# Patient Record
Sex: MALE | Race: WHITE | NOT HISPANIC OR LATINO | Employment: OTHER | URBAN - METROPOLITAN AREA
[De-identification: names, ages, dates, MRNs, and addresses within clinical notes are randomized per-mention and may not be internally consistent; named-entity substitution may affect disease eponyms.]

---

## 2021-02-19 ENCOUNTER — HOSPITAL ENCOUNTER (OUTPATIENT)
Dept: RESEARCH | Facility: CLINIC | Age: 86
Discharge: HOME | End: 2021-02-19
Admitting: STUDENT IN AN ORGANIZED HEALTH CARE EDUCATION/TRAINING PROGRAM

## 2021-02-19 PROCEDURE — 510N000009 HC RESEARCH FACILITY, PER 15 MIN

## 2021-02-19 PROCEDURE — 510N000017 HC CRU PATIENT CARE, PER 15 MIN

## 2021-11-24 ENCOUNTER — APPOINTMENT (OUTPATIENT)
Dept: CT IMAGING | Facility: CLINIC | Age: 86
End: 2021-11-24
Attending: EMERGENCY MEDICINE
Payer: COMMERCIAL

## 2021-11-24 ENCOUNTER — HOSPITAL ENCOUNTER (EMERGENCY)
Facility: CLINIC | Age: 86
Discharge: HOME | End: 2021-11-25
Attending: EMERGENCY MEDICINE | Admitting: EMERGENCY MEDICINE
Payer: COMMERCIAL
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
## 2021-11-24 DIAGNOSIS — S09.90XA INJURY OF HEAD, INITIAL ENCOUNTER: ICD-10-CM

## 2021-11-24 LAB
ALBUMIN SERPL-MCNC: 3.6 G/DL (ref 3.4–5)
ALP SERPL-CCNC: 111 U/L (ref 40–150)
ALT SERPL W P-5'-P-CCNC: 26 U/L (ref 0–70)
ANION GAP SERPL CALCULATED.3IONS-SCNC: 6 MMOL/L (ref 3–14)
AST SERPL W P-5'-P-CCNC: 28 U/L (ref 0–45)
ATRIAL RATE - MUSE: 96 BPM
BASOPHILS # BLD AUTO: 0 10E3/UL (ref 0–0.2)
BASOPHILS NFR BLD AUTO: 0 %
BILIRUB SERPL-MCNC: 0.4 MG/DL (ref 0.2–1.3)
BUN SERPL-MCNC: 15 MG/DL (ref 7–30)
CALCIUM SERPL-MCNC: 9.2 MG/DL (ref 8.5–10.1)
CHLORIDE BLD-SCNC: 107 MMOL/L (ref 94–109)
CO2 SERPL-SCNC: 28 MMOL/L (ref 20–32)
CREAT SERPL-MCNC: 1.08 MG/DL (ref 0.66–1.25)
DIASTOLIC BLOOD PRESSURE - MUSE: NORMAL MMHG
EOSINOPHIL # BLD AUTO: 0.1 10E3/UL (ref 0–0.7)
EOSINOPHIL NFR BLD AUTO: 1 %
ERYTHROCYTE [DISTWIDTH] IN BLOOD BY AUTOMATED COUNT: 12.8 % (ref 10–15)
GFR SERPL CREATININE-BSD FRML MDRD: 61 ML/MIN/1.73M2
GLUCOSE BLD-MCNC: 142 MG/DL (ref 70–99)
HCT VFR BLD AUTO: 45 % (ref 40–53)
HGB BLD-MCNC: 14.8 G/DL (ref 13.3–17.7)
HOLD SPECIMEN: NORMAL
IMM GRANULOCYTES # BLD: 0 10E3/UL
IMM GRANULOCYTES NFR BLD: 0 %
INTERPRETATION ECG - MUSE: NORMAL
LYMPHOCYTES # BLD AUTO: 1.1 10E3/UL (ref 0.8–5.3)
LYMPHOCYTES NFR BLD AUTO: 9 %
MCH RBC QN AUTO: 30.8 PG (ref 26.5–33)
MCHC RBC AUTO-ENTMCNC: 32.9 G/DL (ref 31.5–36.5)
MCV RBC AUTO: 94 FL (ref 78–100)
MONOCYTES # BLD AUTO: 1 10E3/UL (ref 0–1.3)
MONOCYTES NFR BLD AUTO: 9 %
NEUTROPHILS # BLD AUTO: 8.9 10E3/UL (ref 1.6–8.3)
NEUTROPHILS NFR BLD AUTO: 81 %
NRBC # BLD AUTO: 0 10E3/UL
NRBC BLD AUTO-RTO: 0 /100
P AXIS - MUSE: 45 DEGREES
PLATELET # BLD AUTO: 218 10E3/UL (ref 150–450)
POTASSIUM BLD-SCNC: 4.9 MMOL/L (ref 3.4–5.3)
PR INTERVAL - MUSE: 230 MS
PROT SERPL-MCNC: 7.4 G/DL (ref 6.8–8.8)
QRS DURATION - MUSE: 146 MS
QT - MUSE: 382 MS
QTC - MUSE: 482 MS
R AXIS - MUSE: -10 DEGREES
RBC # BLD AUTO: 4.81 10E6/UL (ref 4.4–5.9)
SODIUM SERPL-SCNC: 141 MMOL/L (ref 133–144)
SYSTOLIC BLOOD PRESSURE - MUSE: NORMAL MMHG
T AXIS - MUSE: 1 DEGREES
VENTRICULAR RATE- MUSE: 96 BPM
WBC # BLD AUTO: 11.2 10E3/UL (ref 4–11)

## 2021-11-24 PROCEDURE — 82040 ASSAY OF SERUM ALBUMIN: CPT | Performed by: EMERGENCY MEDICINE

## 2021-11-24 PROCEDURE — 90471 IMMUNIZATION ADMIN: CPT | Performed by: EMERGENCY MEDICINE

## 2021-11-24 PROCEDURE — 90715 TDAP VACCINE 7 YRS/> IM: CPT | Performed by: EMERGENCY MEDICINE

## 2021-11-24 PROCEDURE — 250N000011 HC RX IP 250 OP 636: Performed by: EMERGENCY MEDICINE

## 2021-11-24 PROCEDURE — 99284 EMERGENCY DEPT VISIT MOD MDM: CPT | Mod: 25

## 2021-11-24 PROCEDURE — 70450 CT HEAD/BRAIN W/O DYE: CPT

## 2021-11-24 PROCEDURE — 85025 COMPLETE CBC W/AUTO DIFF WBC: CPT | Performed by: EMERGENCY MEDICINE

## 2021-11-24 PROCEDURE — 36415 COLL VENOUS BLD VENIPUNCTURE: CPT | Performed by: EMERGENCY MEDICINE

## 2021-11-24 PROCEDURE — 93005 ELECTROCARDIOGRAM TRACING: CPT

## 2021-11-24 PROCEDURE — 80053 COMPREHEN METABOLIC PANEL: CPT | Performed by: EMERGENCY MEDICINE

## 2021-11-24 RX ORDER — LIDOCAINE 40 MG/G
CREAM TOPICAL
Status: DISCONTINUED | OUTPATIENT
Start: 2021-11-24 | End: 2021-11-25 | Stop reason: HOSPADM

## 2021-11-24 RX ADMIN — CLOSTRIDIUM TETANI TOXOID ANTIGEN (FORMALDEHYDE INACTIVATED), CORYNEBACTERIUM DIPHTHERIAE TOXOID ANTIGEN (FORMALDEHYDE INACTIVATED), BORDETELLA PERTUSSIS TOXOID ANTIGEN (GLUTARALDEHYDE INACTIVATED), BORDETELLA PERTUSSIS FILAMENTOUS HEMAGGLUTININ ANTIGEN (FORMALDEHYDE INACTIVATED), BORDETELLA PERTUSSIS PERTACTIN ANTIGEN, AND BORDETELLA PERTUSSIS FIMBRIAE 2/3 ANTIGEN 0.5 ML: 5; 2; 2.5; 5; 3; 5 INJECTION, SUSPENSION INTRAMUSCULAR at 23:40

## 2021-11-25 VITALS
HEART RATE: 80 BPM | WEIGHT: 160 LBS | RESPIRATION RATE: 21 BRPM | TEMPERATURE: 98.2 F | SYSTOLIC BLOOD PRESSURE: 167 MMHG | DIASTOLIC BLOOD PRESSURE: 96 MMHG | OXYGEN SATURATION: 94 %

## 2021-11-25 ASSESSMENT — ENCOUNTER SYMPTOMS
BACK PAIN: 0
SHORTNESS OF BREATH: 0
EYE PAIN: 0
ARTHRALGIAS: 1
NECK PAIN: 0
WOUND: 1

## 2021-11-25 NOTE — ED PROVIDER NOTES
"  History   Chief Complaint:  Fall and Head Injury       The history is provided by the patient.      Jun Bell is a 88 year old male with history of peripheral vascular disease who presents with fall and head injury. The patient reports that he fell while crossing the street after a 6 to 7 mile walk around [facility]. He states that he landed on both his hands and hit his head in the fall, noting a contusion to his right brow. Abrasions to his right brow and right cheek are also reported. He notes bilateral \"hand soreness\" and wrist pain when he bends his hands backwards, which is reportedly decreasing in severity. He has not taken any medications for his pain at home. Of note, the patient states that he typically does not walk as far and that he was \"having trouble walking\" at the end of his walk, noting that his legs were getting tired. He reports that he was able to independently stand up and walk home after the fall. Per [facility], the patient's last Tdap was in 2011. He did not experience chest pain or shortness of breath before his fall. He reports no syncope. At this time, the patient denies right eye pain, head pain, dental pain, neck pain, back pain, or leg pain.     Review of Systems   HENT: Negative for dental problem.    Eyes: Negative for pain (R).   Respiratory: Negative for shortness of breath.    Cardiovascular: Negative for chest pain.   Musculoskeletal: Positive for arthralgias (wrists). Negative for back pain and neck pain.   Skin: Positive for wound (head).   Neurological: Negative for syncope.   All other systems reviewed and are negative.      Allergies:  Iodine    Medications:  Singulair  Advair    Past Medical History:     Brow ptosis  Dermatochalasis of both upper eyelids  PVD  Glaucoma  Nuclear sclerosis  Renal colic  Kidney stone    Past Surgical History:    Tympanostomy  Extracapsular cataract removal with lens implant    Family History:    Cataracts  Type II diabetes  Macular " degeneration    Social History:  Presents to the emergency department with his wife  Arrives via wheelchair  Patient has a dog    Physical Exam     Patient Vitals for the past 24 hrs:   BP Temp Temp src Pulse Resp SpO2 Weight   11/25/21 0000 -- -- -- 80 21 94 % --   11/24/21 2345 -- -- -- 84 18 95 % --   11/24/21 2330 (!) 167/96 -- -- 89 21 95 % --   11/24/21 2300 (!) 153/91 -- -- 86 16 95 % --   11/24/21 2245 -- -- -- 85 11 94 % --   11/24/21 2230 (!) 148/87 -- -- 87 24 95 % --   11/24/21 2200 (!) 172/118 -- -- 96 10 97 % --   11/24/21 1945 (!) 168/98 98.2  F (36.8  C) Oral 96 16 95 % 72.6 kg (160 lb)       Physical Exam  General: Resting on the bed.  Head: No obvious trauma to head.  Contusion and abrasion to the right supraorbital   Ears, Nose, Throat:  External ears normal.  Nose normal.  No pharyngeal erythema, swelling or exudate.  Midline uvula.  clear TMs  Eyes:  Conjunctivae clear.  Pupils are equal, round, and reactive.   Neck: Normal range of motion.  Neck supple.  non tender c spine.    CV: Regular rate and rhythm.  No murmurs.      Respiratory: Effort normal and breath sounds normal.  No wheezing or crackles.   Gastrointestinal: Soft.  No distension. There is no tenderness.    Musculoskeletal: Normal range of motion.  Non tender extremities to palpations.  Bilateral wrists non tender to palpation, AROM intact.  2+ radial pulses.  Non tender t/l spine.    Neuro: Alert. Moving all extremities appropriately.  Normal speech.  GCS 15.  CN II-XII grossly intactt.  Gross muscle strength intact of the proximal and distal bilateral upper and lower extremities.  Sensation intact to light touch in all 4 extremities.   Skin: Skin is warm and dry.  No rash noted.       Emergency Department Course     ECG  ECG obtained at 2006, ECG read at 2155  Sinus rhythm with 1st degree AV block  Right bundle branch block, old per chart review   Rate 96 bpm. WA interval 230 ms. QRS duration 146 ms. QT/QTc 382/482 ms. P-R-T axes  45 -10 1.     Imaging:  Head CT w/o contrast   Final Result   IMPRESSION:   1.  No acute intracranial process.        Report per radiology    Laboratory:  Labs Ordered and Resulted from Time of ED Arrival to Time of ED Departure   COMPREHENSIVE METABOLIC PANEL - Abnormal       Result Value    Sodium 141      Potassium 4.9      Chloride 107      Carbon Dioxide (CO2) 28      Anion Gap 6      Urea Nitrogen 15      Creatinine 1.08      Calcium 9.2      Glucose 142 (*)     Alkaline Phosphatase 111      AST 28      ALT 26      Protein Total 7.4      Albumin 3.6      Bilirubin Total 0.4      GFR Estimate 61     CBC WITH PLATELETS AND DIFFERENTIAL - Abnormal    WBC Count 11.2 (*)     RBC Count 4.81      Hemoglobin 14.8      Hematocrit 45.0      MCV 94      MCH 30.8      MCHC 32.9      RDW 12.8      Platelet Count 218      % Neutrophils 81      % Lymphocytes 9      % Monocytes 9      % Eosinophils 1      % Basophils 0      % Immature Granulocytes 0      NRBCs per 100 WBC 0      Absolute Neutrophils 8.9 (*)     Absolute Lymphocytes 1.1      Absolute Monocytes 1.0      Absolute Eosinophils 0.1      Absolute Basophils 0.0      Absolute Immature Granulocytes 0.0      Absolute NRBCs 0.0         Emergency Department Course:    Reviewed:  I reviewed nursing notes, vitals, past medical history and Care Everywhere    Assessments:  2225 I obtained history and examined the patient as noted above.  2252 I rechecked the patient.  2353 I rechecked the patient and explained findings.     Interventions:    Medications   lidocaine 1 % 0.1-1 mL (has no administration in time range)   lidocaine (LMX4) cream (has no administration in time range)   sodium chloride (PF) 0.9% PF flush 3 mL (has no administration in time range)   sodium chloride (PF) 0.9% PF flush 3 mL (has no administration in time range)   Tdap (tetanus-diphtheria-acell pertussis) (ADACEL) injection 0.5 mL (0.5 mLs Intramuscular Given 11/24/21 4759)       Disposition:  The  patient was discharged to home.     Impression & Plan     CMS Diagnoses: None    Medical Decision Makin-year-old male presents with fall and head injury.  Vital signs are stable.  Broad differential was pursued include not limited to syncope, contusion, concussion, skull fracture, facial fracture, laceration, musculoskeletal injury, etc.  Overall patient's well-appearing nontoxic.  EKG was done prior to my assessment and shows no evidence of acute ischemia or arrhythmia.  Patient no chest pain or shortness of breath.  He denied any syncope.  Do not suspect ACS, PE, arrhythmia.  CBC shows no leukocytosis or anemia.  BMP without acute electrolyte, metabolic or renal dysfunction.  I was able to speak with patient about getting head CT and he is agreeable.  Head CT fortunately shows no evidence of acute intracranial pathology.  No other injuries noted on facial examination.  Able to clinically clear C-spine.  Abrasion to the right supraorbital area but no evidence of laceration requiring repair.  This was cleaned and bacitracin was applied.  Patient did have some discomfort with ranging his wrist but declined x-rays.  He has no tenderness on exam.  He is able to move them well.  Low suspicion for occult fracture.  At this point patient wishes to go home.  This appears to have been more of a mechanical fall.  Remainder of head to toe exam is otherwise unremarkable.  Head injury information was provided.  Return precautions were given.  Patient was discharged    Diagnosis:    ICD-10-CM    1. Injury of head, initial encounter  S09.90XA    2. Fall, initial encounter  W19.XXXA        Scribe Disclosure:  I, Aris Rodriguez, am serving as a scribe on 2021 at 10:10 PM to personally document services performed by Jessica Gannon MD based on my observations and the provider's statements to me.             Jessica Gannon MD  21 [id number]

## 2021-11-25 NOTE — ED TRIAGE NOTES
"[place]. Pt states he felt 'a little dizzy\" before his fall. Bilat wrist pain with palpation. Head contusion Noted and dressed with telfa.  "

## 2023-03-01 ENCOUNTER — APPOINTMENT (OUTPATIENT)
Dept: CT IMAGING | Facility: CLINIC | Age: 88
DRG: 177 | End: 2023-03-01
Attending: EMERGENCY MEDICINE
Payer: COMMERCIAL

## 2023-03-01 ENCOUNTER — HOSPITAL ENCOUNTER (INPATIENT)
Facility: CLINIC | Age: 88
LOS: 2 days | Discharge: HOME | DRG: 177 | End: 2023-03-03
Attending: EMERGENCY MEDICINE | Admitting: HOSPITALIST
Payer: COMMERCIAL

## 2023-03-01 ENCOUNTER — APPOINTMENT (OUTPATIENT)
Dept: GENERAL RADIOLOGY | Facility: CLINIC | Age: 88
DRG: 177 | End: 2023-03-01
Attending: EMERGENCY MEDICINE
Payer: COMMERCIAL

## 2023-03-01 DIAGNOSIS — R41.0 CONFUSION: ICD-10-CM

## 2023-03-01 DIAGNOSIS — R29.810 FACIAL DROOP: ICD-10-CM

## 2023-03-01 DIAGNOSIS — R47.81 SLURRED SPEECH: ICD-10-CM

## 2023-03-01 DIAGNOSIS — J18.9 PNEUMONIA OF RIGHT UPPER LOBE DUE TO INFECTIOUS ORGANISM: ICD-10-CM

## 2023-03-01 DIAGNOSIS — G45.9 TIA (TRANSIENT ISCHEMIC ATTACK): Primary | ICD-10-CM

## 2023-03-01 PROBLEM — H40.003 GLAUCOMA SUSPECT OF BOTH EYES: Status: ACTIVE | Noted: 2017-03-01

## 2023-03-01 PROBLEM — U07.1 INFECTION DUE TO 2019 NOVEL CORONAVIRUS: Status: ACTIVE | Noted: 2023-03-01

## 2023-03-01 LAB
ALBUMIN SERPL BCG-MCNC: 4 G/DL (ref 3.5–5.2)
ALP SERPL-CCNC: 107 U/L (ref 40–129)
ALT SERPL W P-5'-P-CCNC: 7 U/L (ref 10–50)
ANION GAP SERPL CALCULATED.3IONS-SCNC: 10 MMOL/L (ref 7–15)
APTT PPP: 30 SECONDS (ref 22–38)
AST SERPL W P-5'-P-CCNC: 17 U/L (ref 10–50)
BASOPHILS # BLD AUTO: 0 10E3/UL (ref 0–0.2)
BASOPHILS NFR BLD AUTO: 0 %
BILIRUB DIRECT SERPL-MCNC: <0.2 MG/DL (ref 0–0.3)
BILIRUB SERPL-MCNC: 0.5 MG/DL
BUN SERPL-MCNC: 10.5 MG/DL (ref 8–23)
CALCIUM SERPL-MCNC: 9.1 MG/DL (ref 8.8–10.2)
CHLORIDE SERPL-SCNC: 100 MMOL/L (ref 98–107)
CREAT SERPL-MCNC: 1.05 MG/DL (ref 0.67–1.17)
DEPRECATED HCO3 PLAS-SCNC: 29 MMOL/L (ref 22–29)
EOSINOPHIL # BLD AUTO: 0 10E3/UL (ref 0–0.7)
EOSINOPHIL NFR BLD AUTO: 0 %
ERYTHROCYTE [DISTWIDTH] IN BLOOD BY AUTOMATED COUNT: 13.2 % (ref 10–15)
GFR SERPL CREATININE-BSD FRML MDRD: 68 ML/MIN/1.73M2
GLUCOSE SERPL-MCNC: 157 MG/DL (ref 70–99)
HBA1C MFR BLD: 6 %
HCT VFR BLD AUTO: 45.9 % (ref 40–53)
HGB BLD-MCNC: 14.8 G/DL (ref 13.3–17.7)
IMM GRANULOCYTES # BLD: 0 10E3/UL
IMM GRANULOCYTES NFR BLD: 0 %
INR PPP: 1.19 (ref 0.85–1.15)
LYMPHOCYTES # BLD AUTO: 0.5 10E3/UL (ref 0.8–5.3)
LYMPHOCYTES NFR BLD AUTO: 6 %
MCH RBC QN AUTO: 30.2 PG (ref 26.5–33)
MCHC RBC AUTO-ENTMCNC: 32.2 G/DL (ref 31.5–36.5)
MCV RBC AUTO: 94 FL (ref 78–100)
MONOCYTES # BLD AUTO: 0.9 10E3/UL (ref 0–1.3)
MONOCYTES NFR BLD AUTO: 10 %
NEUTROPHILS # BLD AUTO: 7.8 10E3/UL (ref 1.6–8.3)
NEUTROPHILS NFR BLD AUTO: 84 %
NRBC # BLD AUTO: 0 10E3/UL
NRBC BLD AUTO-RTO: 0 /100
PLATELET # BLD AUTO: 205 10E3/UL (ref 150–450)
POTASSIUM SERPL-SCNC: 4 MMOL/L (ref 3.4–5.3)
PROT SERPL-MCNC: 6.9 G/DL (ref 6.4–8.3)
RBC # BLD AUTO: 4.9 10E6/UL (ref 4.4–5.9)
SARS-COV-2 RNA RESP QL NAA+PROBE: POSITIVE
SODIUM SERPL-SCNC: 139 MMOL/L (ref 136–145)
TROPONIN T SERPL HS-MCNC: 27 NG/L
WBC # BLD AUTO: 9.4 10E3/UL (ref 4–11)

## 2023-03-01 PROCEDURE — 85610 PROTHROMBIN TIME: CPT | Performed by: EMERGENCY MEDICINE

## 2023-03-01 PROCEDURE — 99291 CRITICAL CARE FIRST HOUR: CPT | Performed by: PHYSICIAN ASSISTANT

## 2023-03-01 PROCEDURE — 99223 1ST HOSP IP/OBS HIGH 75: CPT | Mod: AI | Performed by: HOSPITALIST

## 2023-03-01 PROCEDURE — 250N000011 HC RX IP 250 OP 636: Performed by: EMERGENCY MEDICINE

## 2023-03-01 PROCEDURE — 70450 CT HEAD/BRAIN W/O DYE: CPT

## 2023-03-01 PROCEDURE — 93005 ELECTROCARDIOGRAM TRACING: CPT

## 2023-03-01 PROCEDURE — 82248 BILIRUBIN DIRECT: CPT | Performed by: HOSPITALIST

## 2023-03-01 PROCEDURE — 87040 BLOOD CULTURE FOR BACTERIA: CPT | Performed by: EMERGENCY MEDICINE

## 2023-03-01 PROCEDURE — 85025 COMPLETE CBC W/AUTO DIFF WBC: CPT | Performed by: EMERGENCY MEDICINE

## 2023-03-01 PROCEDURE — 85730 THROMBOPLASTIN TIME PARTIAL: CPT | Performed by: EMERGENCY MEDICINE

## 2023-03-01 PROCEDURE — 80061 LIPID PANEL: CPT | Performed by: HOSPITALIST

## 2023-03-01 PROCEDURE — U0005 INFEC AGEN DETEC AMPLI PROBE: HCPCS | Performed by: EMERGENCY MEDICINE

## 2023-03-01 PROCEDURE — 120N000001 HC R&B MED SURG/OB

## 2023-03-01 PROCEDURE — C9803 HOPD COVID-19 SPEC COLLECT: HCPCS

## 2023-03-01 PROCEDURE — 250N000009 HC RX 250: Performed by: EMERGENCY MEDICINE

## 2023-03-01 PROCEDURE — 99291 CRITICAL CARE FIRST HOUR: CPT | Mod: CS,25

## 2023-03-01 PROCEDURE — 36415 COLL VENOUS BLD VENIPUNCTURE: CPT | Performed by: EMERGENCY MEDICINE

## 2023-03-01 PROCEDURE — 70496 CT ANGIOGRAPHY HEAD: CPT

## 2023-03-01 PROCEDURE — 70498 CT ANGIOGRAPHY NECK: CPT

## 2023-03-01 PROCEDURE — 71046 X-RAY EXAM CHEST 2 VIEWS: CPT

## 2023-03-01 PROCEDURE — 83036 HEMOGLOBIN GLYCOSYLATED A1C: CPT | Performed by: HOSPITALIST

## 2023-03-01 PROCEDURE — XW033E5 INTRODUCTION OF REMDESIVIR ANTI-INFECTIVE INTO PERIPHERAL VEIN, PERCUTANEOUS APPROACH, NEW TECHNOLOGY GROUP 5: ICD-10-PCS | Performed by: HOSPITALIST

## 2023-03-01 PROCEDURE — 84484 ASSAY OF TROPONIN QUANT: CPT | Performed by: EMERGENCY MEDICINE

## 2023-03-01 PROCEDURE — 80053 COMPREHEN METABOLIC PANEL: CPT | Performed by: EMERGENCY MEDICINE

## 2023-03-01 RX ORDER — IOPAMIDOL 755 MG/ML
75 INJECTION, SOLUTION INTRAVASCULAR ONCE
Status: COMPLETED | OUTPATIENT
Start: 2023-03-01 | End: 2023-03-01

## 2023-03-01 RX ORDER — LIDOCAINE 40 MG/G
CREAM TOPICAL
Status: DISCONTINUED | OUTPATIENT
Start: 2023-03-01 | End: 2023-03-03 | Stop reason: HOSPADM

## 2023-03-01 RX ORDER — ACETAMINOPHEN 325 MG/1
650 TABLET ORAL EVERY 6 HOURS PRN
Status: DISCONTINUED | OUTPATIENT
Start: 2023-03-01 | End: 2023-03-02

## 2023-03-01 RX ORDER — AMPICILLIN AND SULBACTAM 2; 1 G/1; G/1
3 INJECTION, POWDER, FOR SOLUTION INTRAMUSCULAR; INTRAVENOUS ONCE
Status: COMPLETED | OUTPATIENT
Start: 2023-03-01 | End: 2023-03-01

## 2023-03-01 RX ORDER — ONDANSETRON 2 MG/ML
4 INJECTION INTRAMUSCULAR; INTRAVENOUS EVERY 6 HOURS PRN
Status: DISCONTINUED | OUTPATIENT
Start: 2023-03-01 | End: 2023-03-03 | Stop reason: HOSPADM

## 2023-03-01 RX ORDER — ONDANSETRON 4 MG/1
4 TABLET, ORALLY DISINTEGRATING ORAL EVERY 6 HOURS PRN
Status: DISCONTINUED | OUTPATIENT
Start: 2023-03-01 | End: 2023-03-03 | Stop reason: HOSPADM

## 2023-03-01 RX ORDER — AZITHROMYCIN 500 MG/1
500 INJECTION, POWDER, LYOPHILIZED, FOR SOLUTION INTRAVENOUS ONCE
Status: DISCONTINUED | OUTPATIENT
Start: 2023-03-01 | End: 2023-03-01

## 2023-03-01 RX ORDER — ACETAMINOPHEN 650 MG/1
650 SUPPOSITORY RECTAL EVERY 6 HOURS PRN
Status: DISCONTINUED | OUTPATIENT
Start: 2023-03-01 | End: 2023-03-02

## 2023-03-01 RX ORDER — CEFTRIAXONE 2 G/1
2 INJECTION, POWDER, FOR SOLUTION INTRAMUSCULAR; INTRAVENOUS ONCE
Status: DISCONTINUED | OUTPATIENT
Start: 2023-03-01 | End: 2023-03-01

## 2023-03-01 RX ADMIN — SODIUM CHLORIDE 100 ML: 900 INJECTION INTRAVENOUS at 16:01

## 2023-03-01 RX ADMIN — AMPICILLIN SODIUM AND SULBACTAM SODIUM 3 G: 2; 1 INJECTION, POWDER, FOR SOLUTION INTRAMUSCULAR; INTRAVENOUS at 18:19

## 2023-03-01 RX ADMIN — IOPAMIDOL 75 ML: 755 INJECTION, SOLUTION INTRAVENOUS at 16:00

## 2023-03-01 ASSESSMENT — ACTIVITIES OF DAILY LIVING (ADL)
ADLS_ACUITY_SCORE: 35

## 2023-03-01 NOTE — CONSULTS
[facility]    Stroke Consult Note    Reason for Consult: Stroke Code     Chief Complaint: Stroke Symptoms      HPI  Jun Bell is a 89 year old male with pertinent past medical history of glaucoma, no prior strokes, bilateral vitreous detachments in the past.    He presented to [facility] emergency department due to slurred speech, confusion, left facial droop, possible left greater than right arm weakness.  He was last known well 12 PM today when he went out for a walk.  When he returned 1445 he had the above findings.  He also reported to his daughter that he had fallen 5 times while on his walk in his house to fall into the snow bank; however, he did not have any evidence of falls and his clothes were completely dry while it is wet outside.  In the ED he was assessed and has very mild slurred speech, per report from family and friends this is significantly improved when he was first found.  At the time being found they said he was able to attempt full sentences but the words were severely slurred and difficult to understand.  No incorrect word usage.  In ED no residual left facial droop.  No associated aphasia or weakness.  No residual confusion although he has significant hearing loss.  CT/CTA were unremarkable.  Discussed with patient and his family that given minor deficits would not be a good candidate for TNK.  /78. BG pending.    Imaging Findings  CTh:  No evidence of acute intracranial hemorrhage, mass, or herniation.    CTA head and neck:  1. Patent arteries in the neck without evidence of dissection. Mild  atherosclerotic disease in the carotid arteries bilaterally without  significant stenosis by NASCET criteria.  2. Patent proximal major intracranial arteries without vascular  cutoff. No significant stenosis. No aneurysm identified.   3. Patchy groundglass opacities in the visualized right upper lung.  This could represent pneumonia.  4. 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     Intravenous Thrombolysis  Not given due to:   - minor/isolated/quickly resolving symptoms    Endovascular Treatment  Not initiated due to absence of proximal vessel occlusion    Impression   Transient confusion (not aphasia), left facial droop, possible left arm weakness, persistent but improved slurred speech, rule out acute stroke versus toxic/infectious/metabolic etiology    Recommendations  -MRI brain with and without contrast, please page stroke team once resulted so we can review imaging  -Rule out toxic/infectious/metabolic etiologies    Patient Follow-up     - final recommendation pending work-up    Thank you for this consult.      Cristin Newsome PA-C  Vascular Neurology    To page me or covering stroke neurology team member, click here: [facility]  Choose \"On Call\" tab at top, then select \"NEUROLOGY/ALL SITES\" from middle drop-down box, press Enter, then look for \"stroke\" or \"telestroke\" for your site.    ______________________________________________________    Clinically Significant Risk Factors Present on Admission               # Coagulation Defect: INR = 1.19 (Ref range: 0.85 - 1.15) and/or PTT = 30 Seconds (Ref range: 22 - 38 Seconds), will monitor for bleeding               Past Medical History   No past medical history on file.  Past Surgical History   No past surgical history on file.  Medications   Home Meds  Prior to Admission medications    Not on File       Scheduled Meds    azithromycin  500 mg Intravenous Once     cefTRIAXone  2 g Intravenous Once       Infusion Meds      PRN Meds      Allergies   No Known Allergies  Family History   No family history on file.  Social History        Review of Systems   The 10 point Review of Systems is negative other than noted in the HPI or here.        PHYSICAL EXAMINATION  Temp:  [99.8  F (37.7  C)] 99.8  F (37.7  C)  Pulse:  [84-92] 87  Resp:  [13-25] 23  BP: (132-133)/(76-78) 132/76  SpO2:  [93 %-97 %] 94 %     General Exam  General:  " patient lying in bed without any acute distress    HEENT:  normocephalic/atraumatic  Pulmonary:  no respiratory distress     Neuro Exam  Mental Status:  alert, oriented x 4 (although says it is February 2023 when it is now March 1st), follows commands, speech clear and fluent, naming and repetition normal  Cranial Nerves:  visual fields intact, PERRL, EOMI with normal smooth pursuit, facial sensation intact and symmetric, facial movements symmetric, hearing not formally tested but intact to conversation, palate elevation symmetric and uvula midline, shoulder shrug strong bilaterally, tongue protrusion midline, Very mild dysarthria  Motor:  normal muscle tone and bulk, no abnormal movements, able to move all limbs spontaneously, strength 5/5 throughout upper and lower extremities, no pronator drift  Reflexes:  toes down-going  Sensory:  light touch sensation intact and symmetric throughout upper and lower extremities, no extinction on double simultaneous stimulation   Coordination:  normal finger-to-nose and heel-to-shin bilaterally without dysmetria, rapid alternating movements symmetric  Station/Gait:  deferred    Dysphagia Screen  Dysarthria or facial droop present - Maintain NPO, consult SLP    Stroke Scales    NIHSS  1a. Level of Consciousness 0-->Alert, keenly responsive   1b. LOC Questions (S) 1-->Answers one question correctly (says February 2023 (it is March 1st now))   1c. LOC Commands 0-->Performs both tasks correctly   2.   Best Gaze 0-->Normal   3.   Visual 0-->No visual loss   4.   Facial Palsy 0-->Normal symmetrical movements   5a. Motor Arm, Left 0-->No drift, limb holds 90 (or 45) degrees for full 10 secs   5b. Motor Arm, Right 0-->No drift, limb holds 90 (or 45) degrees for full 10 secs   6a. Motor Leg, Left 0-->No drift, leg holds 30 degree position for full 5 secs   6b. Motor Leg, right 0-->No drift, leg holds 30 degree position for full 5 secs   7.   Limb Ataxia 0-->Absent   8.   Sensory  0-->Normal, no sensory loss   9.   Best Language 0-->No aphasia, normal   10. Dysarthria 1-->Mild-to-moderate dysarthria, patient slurs at least some words and, at worst, can be understood with some difficulty   11. Extinction and Inattention  0-->No abnormality   Total 2 (03/01/23 1726)       Modified Luis Felipe Score (Pre-morbid)  2 - Slight disability.  Able to look after own affairs without assistance, but unable to carry out all previous activities.    Imaging  I personally reviewed all imaging; relevant findings per HPI.     Lab Results Data   CBC  Recent Labs   Lab 03/01/23  1558   WBC 9.4   RBC 4.90   HGB 14.8   HCT 45.9        Basic Metabolic Panel    Recent Labs   Lab 03/01/23  1558      POTASSIUM 4.0   CHLORIDE 100   CO2 29   BUN 10.5   CR 1.05   *   SHARON 9.1     Liver Panel  No results for input(s): PROTTOTAL, ALBUMIN, BILITOTAL, ALKPHOS, AST, ALT, BILIDIRECT in the last 168 hours.  INR    Recent Labs   Lab Test 03/01/23  1558   INR 1.19*      Lipid Profile  No lab results found.  A1C  No lab results found.  Troponin    Recent Labs   Lab 03/01/23  1558   CTROPT 27*          Stroke Code Data Data   Stroke Code Data  (for stroke code without tele)  Stroke code activated 03/01/23   1554   First stroke provider response 03/01/23   1558   Last known normal 03/01/23   1200   Time of discovery   (or onset of symptoms) 03/01/23   1445   Head CT read by Stroke Neuro Dr/Provider 03/01/23   1607   Was stroke code de-escalated? Yes 03/01/23 1621            I personally examined and evaluated the patient today. At the time of my evaluation and management the patient was in critical condition today due to stroke code. I personally managed review of chart, medical record, meds, imaging, history, exam, and discussion with attending regarding plan and documentation. I spent a total of 90 minutes providing critical care services, evaluating the patient, directing care and reviewing laboratory values and  radiologic reports.

## 2023-03-01 NOTE — ED PROVIDER NOTES
Rapid Assessment Note    History:   Jun Bell is a 89 year old male who presents with slurred speech, confusion, and left greater than right upper extremity weakness noted approximately 1 hour prior to arrival around 2:45 PM.  He was last known to be completely normal at noon.    Here in the ER, he can move all of his extremities, and is noted to have improved by neighbor who accompanies the patient.    I have called a tier 1 code stroke given time less than 4 hours.      Exam:   General:  Alert, interactive  Cardiovascular:  Well perfused  Lungs:  No respiratory distress, no accessory muscle use  Neuro:  Moving all 4 extremities, clear speech, answering questions appropriately for most part.  Skin:  Warm, dry  Psych:  Normal affect      Plan of Care:   I evaluated the patient and developed an initial plan of care. I discussed this plan and explained that I, or one of my partners, would be returning to complete the evaluation.           3/1/2023  [employer]    Portions of this medical record were completed by a scribe. UPON MY REVIEW AND AUTHENTICATION BY ELECTRONIC SIGNATURE, this confirms (a) I performed the applicable clinical services, and (b) the record is accurate.        Ovidio Morocho MD  03/01/23 5978

## 2023-03-01 NOTE — H&P
"[facility]    History and Physical - Hospitalist Service       Date of Admission:  3/1/2023    Assessment & Plan      Jun Bell is a 89 year old male admitted on 3/1/2023 for suspected stroke. He was found to be covid positive in the ED.    # Confirmed COVID-19 infection    # Viral Pneumonia secondary to COVID-19 infection     Symptom Onset Date used to determine duration of isolation.  If unsure, enter \"unknown\"   Date of 1st Positive Test 03/01/23   Vaccination Status Fully Vaccinated       - COVID-19 special precautions, continuous pulse-ox  - Oxygen: Not on oxygen  - Labs: Standard COVID admission labs ordered (CBC with diff, CMP, INR, D-dimer, CRP).   - Imaging: CTA head revealed ground glass opacities in the right upper lungs  - Breathing treatments: no inhalers needed; avoid nebulizers in favor of MDIs   - IV fluids: not indicated at this time  - Antibiotics: concern for aspiration pneumonia, will empirically start on ceftriaxone and azithromycin   - COVID-Focused Medications: Remdesivir x 3 days or until hospital discharge, started on 03/01/23  - DVT Prophylaxis:         - At high risk of thrombotic complications due to COVID-19 (DDimer = N/A )         - hold off anticoagulation pending d-dimer.      Left facial droop  Slurred speech  *Symptoms now resolved  *Stroke alert in the ED, neurology denied them appropriate for tPA  *CT head, CTA head and neck were unremarkable  Plan  --Admit to inpatient  --Neurochecks every 4 hours  --Obtain MRI brain with and without contrast  --NPO pending swallow eval  --PT/OT/SLP  --Echo         Diet: NPO for Medical/Clinical Reasons Except for: Meds    DVT Prophylaxis: Pneumatic Compression Devices  [place]ake Catheter: Not present  Lines: None     Cardiac Monitoring: None  Code Status:   full code    Clinically Significant Risk Factors Present on Admission               # Coagulation Defect: INR = 1.19 (Ref range: 0.85 - 1.15) and/or PTT = 30 Seconds " (Ref range: 22 - 38 Seconds), will monitor for bleeding                 Disposition Plan      Expected Discharge Date: 03/03/2023                  Brit Arnold MD  Hospitalist Service  [facility]  Securely message with [employer] (more info)  Text page via [employer] Paging/Directory     ______________________________________________________________________    Chief Complaint   Left facial droop   dysarthria    History is obtained from the patient and patients family    History of Present Illness   Jun Bell is a 89 year old male who is brought in by family due to sudden onset of dysarthria and left facial droop.     He has a PMHx significant for glaucoma and nephrolithiasis.    He was brought into the ED by family after he was thought to have slurred speech and a left facial droop. Patient lives at home with his daughter. His wife, although does not live with him, notes she speaks with him everyday. History was obtained from both wife and patient.   Per wife, patient was noted to be last known well about noon time, seen by his daughter. Afterwards, he was noted to have slurred speech and left facial droop prompting them to call EMS.     Per patient, he had just finished shoveling the druiveway of  2 of his neighbors. He returned home and subsequently went to walk his dog. He reports falling about 5 times enroute back home, and then had difficulty climbing up the stairs leading into his home. The difficulty in ambulation resolved once he was inside his home. Per family, they question if this actually happened as patient was dry when he got back home despite him claiming he fell into the snow those 5 times.     In the ED, stroke code alert was called.  Patient was evaluated by neurology and deemed not to be a tPA candidate. No facial droop was observed and his speech was slightly slurred although has not returned to normal.  CT head was negative for any acute intracranial abnormalities.  CTA head  was negative for any occlusion or stenosis      Past Medical History    No past medical history on file.    Past Surgical History   No past surgical history on file.    Prior to Admission Medications   None        Review of Systems    The 10 point Review of Systems is negative other than noted in the HPI or here.      Physical Exam   Vital Signs: Temp: 99.8  F (37.7  C) Temp src: Temporal BP: 132/76 Pulse: 87   Resp: 23 SpO2: 94 % O2 Device: None (Room air)    Weight: 146 lbs 13.22 oz    General Appearance: Well appearing for stated age.  Respiratory: CTAB, no rales or ronchi  Cardiovascular: S1, S2 normal, no murmurs  GI: non-tender on palpation, BS present  Neuro: No focal deficits.     Medical Decision Making       55 MINUTES SPENT BY ME on the date of service doing chart review, history, exam, documentation & further activities per the note.      Data     I have personally reviewed the following data over the past 24 hrs:    9.4  \   14.8   / 205     139 100 10.5 /  157 (H)   4.0 29 1.05 \       ALT: 7 (L) AST: 17 AP: 107 TBILI: 0.5   ALB: 4.0 TOT PROTEIN: 6.9 LIPASE: N/A       Trop: 27 (H) BNP: N/A       INR:  1.19 (H) PTT:  30   D-dimer:  N/A Fibrinogen:  N/A       Imaging results reviewed over the past 24 hrs:   Recent Results (from the past 24 hour(s))   CT Head w/o Contrast    Narrative    CT SCAN OF THE HEAD WITHOUT CONTRAST   3/1/2023 4:04 PM     HISTORY: Code stroke to evaluate for potential thrombolysis and  thrombectomy. Altered mental status.    TECHNIQUE: Axial images of the head and coronal reformations without  IV contrast material. Radiation dose for this scan was reduced using  automated exposure control, adjustment of the mA and/or kV according  to patient size, or iterative reconstruction technique.    COMPARISON: Head CT 11/24/2021.    FINDINGS: There is no evidence of intracranial hemorrhage, mass, acute  infarct or anomaly. The ventricles are normal in size, shape and  configuration. The  brain parenchyma and subarachnoid spaces are  normal.     Complete opacification of the right maxillary sinus. The bony  calvarium and bones of the skull base appear intact.       Impression    IMPRESSION: No evidence of acute intracranial hemorrhage, mass, or  herniation.      DON SANCHEZ MD         SYSTEM ID:  [id number]   CTA Head Neck with Contrast    Narrative    CT ANGIOGRAM OF THE HEAD AND NECK WITH CONTRAST  3/1/2023 4:09 PM     HISTORY: Code stroke to evaluate for potential thrombolysis and  thrombectomy. Altered mental status.    TECHNIQUE: CT angiography with an injection of 75 mL Isovue-370 IV  with scans through the head and neck. Images were transferred to a  separate 3-D workstation where multiplanar reformations and 3-D images  were created. Estimates of carotid stenoses are made relative to the  distal internal carotid artery diameters except as noted. Radiation  dose for this scan was reduced using automated exposure control,  adjustment of the mA and/or kV according to patient size, or iterative  reconstruction technique.    COMPARISON: None.     CT HEAD FINDINGS: No contrast enhancing lesions. Cerebral blood flow  is grossly normal.     CT ANGIOGRAM HEAD FINDINGS:  The major intracranial arteries including  the proximal branches of the anterior cerebral, middle cerebral, and  posterior cerebral arteries appear patent without vascular cutoff. No  aneurysm identified. No significant stenosis. Venous circulation is  unremarkable.     CT ANGIOGRAM NECK FINDINGS:   Normal origin of the great vessels from the aortic arch.     Right carotid artery: The right common and internal carotid arteries  are patent. Mild atherosclerotic disease at the carotid bifurcation  and proximal internal carotid artery without significant stenosis by  NASCET criteria.     Left carotid artery: The left common and internal carotid arteries are  patent. Mild atherosclerotic disease at the carotid bifurcation and  proximal  internal carotid artery without significant stenosis by  NASCET criteria.     Vertebral arteries: Vertebral arteries are patent without evidence of  dissection. No significant stenosis.     Other findings: Patchy groundglass opacities in the visualized right  upper lung. There are degenerative changes in the spine.    Large pocket of heterogeneous debris projecting inferiorly from the  hypopharynx, this is favored represent a Zenker's diverticulum.      Impression    IMPRESSION:   1. Patent arteries in the neck without evidence of dissection. Mild  atherosclerotic disease in the carotid arteries bilaterally without  significant stenosis by NASCET criteria.  2. Patent proximal major intracranial arteries without vascular  cutoff. No significant stenosis. No aneurysm identified.   3. Patchy groundglass opacities in the visualized right upper lung.  This could represent pneumonia.  4. 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    Results discussed with Stacia Tang at 4:21 PM on 3/1/2023.     DON SANCHEZ MD         SYSTEM ID:  [id number]   Chest XR,  PA & LAT    Narrative    EXAM: XR CHEST 2 VIEWS  LOCATION: [facility]  DATE/TIME: 3/1/2023 4:41 PM    INDICATION: Confusion.  COMPARISON: None.      Impression    IMPRESSION:     Although the anterior right first rib projects over this region, suspicion for subtle right upper lung opacities; infectious / inflammatory process not excluded. Minimal left base bandlike opacity, probably atelectasis.     No pleural effusion or pneumothorax. Normal heart size. Aortic atherosclerosis.

## 2023-03-01 NOTE — ED PROVIDER NOTES
History     Chief Complaint:  Stroke Symptoms       The history is provided by a friend.      Jun Bell is a 89 year old male who presents with stroke symptoms.  He was last seen normal by his daughter today at noon before she left.  The wife called him in the afternoon and found his speech to be slurred.  The wife then called a neighbor who ran over to see him.  The neighbor states that he had a left-sided facial droop with slurred speech and called 911.  The blood sugar was normal per EMS.  Upon arrival to the emergency room, his droop has resolved, but he still has speech difficulty. His last known well was at 1200 earlier today. He does not have a history of stroke. He denies numbness/tingling or headache, chest pain, shortness of breath.  The neighbor also states that when she went over there that he had stated that he fell 5 times while walking the dog.  His coat and his clothing had absolutely no signs of falling.  The dog was outside in the backyard.  They suspect that this is not true.    Of note the patient's wife states that he does aspirate food frequently.  She states that he chronically coughs up phlegm into a tissue.  He denies this.    Independent Historian: the neighbor the wife after she arrived later.    Review of External Notes: None      ROS:  Review of Systems   All other systems reviewed and are negative.    Allergies:  No Known Allergies     Medications:    The patient denies current use of medications.     Past Medical History:    Glaucoma     Past Surgical History:    L cataract extraction   Unspecified tympanostomy tube placement     Family History:    Mother - cataract, Type 2 DM, macular degeneration     Social History:  PCP: Chery Mackenzie   The patient presents to the ED with his neighbor via private vehicle   The patient lives at home with his wife     Physical Exam     Patient Vitals for the past 24 hrs:   BP Temp Temp src Pulse Resp SpO2 Weight   03/01/23 1618 -- -- -- 87 23 94  % --   03/01/23 1617 -- -- -- 87 15 93 % --   03/01/23 1616 -- -- -- 84 13 93 % --   03/01/23 1615 -- -- -- 92 25 93 % --   03/01/23 1610 132/76 -- -- 92 -- -- --   03/01/23 1555 133/78 99.8  F (37.7  C) Temporal 86 20 97 % 66.6 kg (146 lb 13.2 oz)        Physical Exam  Physical Exam   Constitutional:  Patient is alert. They appear well-developed and well-nourished. No acute distress.   HENT:   Mouth/Throat:   Oropharynx is clear and moist. No tongue laceration  Eyes:    Conjunctivae normal and EOM are normal. Pupils are equal, round, and reactive to light.   Neck:    Normal range of motion.   Cardiovascular: Normal rate, regular rhythm and normal heart sounds.  Exam reveals no gallop and no friction rub.  No murmur heard.  Pulmonary/Chest:  Effort normal and breath sounds normal. Patient has no wheezes. Patient has no rales.   Abdominal:   Soft. Bowel sounds are normal. Patient exhibits no mass. There is no tenderness. There is no rebound and no guarding.   Musculoskeletal:  Normal range of motion. Patient exhibits no edema.   Neurological:   Patient is alert.  Appears to have memory issues.  Patient has normal strength.  I do not appreciate any facial droop.  No facial palsy.Does appear to have some confusion.  No gaze deviation.  There is some mild dysarthria..   Skin:   Skin is warm and dry. No rash noted. No erythema.   Psychiatric:   Unable to assess.     Emergency Department Course   ECG:  ECG taken at 1646, ECG read at 1700  Sinus rhythm with 1st degree block  Right bundle branch block   Abnormal ECG   Rate 80 bpm. AR interval 238 ms. QRS duration 144 ms. QT/QTc 412/475 ms. P-R-T axes 51 10 31.      Imaging:  Chest XR,  PA & LAT  Final Result  IMPRESSION:   Although the anterior right first rib projects over this region, suspicion for subtle right upper lung opacities; infectious / inflammatory process not excluded. Minimal left base bandlike opacity, probably atelectasis.   No pleural effusion or  pneumothorax. Normal heart size. Aortic atherosclerosis.    CTA Head Neck with Contrast  Final Result  IMPRESSION:   1. Patent arteries in the neck without evidence of dissection. Mild  atherosclerotic disease in the carotid arteries bilaterally without  significant stenosis by NASCET criteria.  2. Patent proximal major intracranial arteries without vascular  cutoff. No significant stenosis. No aneurysm identified.   3. Patchy groundglass opacities in the visualized right upper lung.  This could represent pneumonia.  4. 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  Results discussed with Stacia Tang at 4:21 PM on 3/1/2023.   DON SANCHEZ MD     CT Head w/o Contrast  Final Result  IMPRESSION: No evidence of acute intracranial hemorrhage, mass, or  herniation.  DON SANCHEZ MD     Report per radiology    Laboratory:  Labs Ordered and Resulted from Time of ED Arrival to Time of ED Departure   BASIC METABOLIC PANEL - Abnormal       Result Value    Sodium 139      Potassium 4.0      Chloride 100      Carbon Dioxide (CO2) 29      Anion Gap 10      Urea Nitrogen 10.5      Creatinine 1.05      Calcium 9.1      Glucose 157 (*)     GFR Estimate 68     INR - Abnormal    INR 1.19 (*)    TROPONIN T, HIGH SENSITIVITY - Abnormal    Troponin T, High Sensitivity 27 (*)    CBC WITH PLATELETS AND DIFFERENTIAL - Abnormal    WBC Count 9.4      RBC Count 4.90      Hemoglobin 14.8      Hematocrit 45.9      MCV 94      MCH 30.2      MCHC 32.2      RDW 13.2      Platelet Count 205      % Neutrophils 84      % Lymphocytes 6      % Monocytes 10      % Eosinophils 0      % Basophils 0      % Immature Granulocytes 0      NRBCs per 100 WBC 0      Absolute Neutrophils 7.8      Absolute Lymphocytes 0.5 (*)     Absolute Monocytes 0.9      Absolute Eosinophils 0.0      Absolute Basophils 0.0      Absolute Immature Granulocytes 0.0      Absolute NRBCs 0.0     PARTIAL THROMBOPLASTIN TIME - Normal    aPTT 30     GLUCOSE MONITOR NURSING POCT   ROUTINE  UA WITH MICROSCOPIC REFLEX TO CULTURE   COVID-19 VIRUS (CORONAVIRUS) BY PCR   BLOOD CULTURE   BLOOD CULTURE      Emergency Department Course & Assessments:    Interventions:  Medications   ampicillin-sulbactam (UNASYN) 3 g vial to attach to  mL bag (has no administration in time range)   iopamidol (ISOVUE-370) solution 75 mL (75 mLs Intravenous $Given 3/1/23 1600)   Saline Flush - CT (100 mLs Intravenous $Given 3/1/23 1601)      Independent Interpretation (X-rays, CTs, rhythm strip):  I reviewed the patient's images, and agree with radiology's interpretation      Consultations/Discussion of Management or Tests:  1621 I spoke with Radiology regarding findings.   1750 I spoke with Dr Arnold from Hospitalist Services, who accepts the patient for admission.    Social Determinants of Health affecting care:  None      Assessments:  1553 I obtained history and examined the patient as noted above.  1610 The Stroke Neurology team is in the room assessing the patient.   1620 Code stroke was deescalated.     Disposition:  The patient was admitted to the hospital under the care of Dr. Arnold.     Impression & Plan    CMS Diagnoses: The patient has stroke symptoms:         ED Stroke specific documentation           NIHSS PDF     Patient last known well time: 1200  ED Provider first to bedside at: 1553  CT Results received at: 1621    Thrombolytics:   Not given due to:   - no large vessel occlusion    If treating with thrombolytics: Ensure SBP<180 and DBP<105 prior to treatment with thrombolytics.  Administering thrombolytics after treatment with IV labetalol, hydralazine, or nicardipine is reasonable once BP control is established.    Endovascular Retrieval:  Not initiated due to absence of proximal vessel occlusion    National Institutes of Health Stroke Scale (Baseline)  Time Performed: 1553     Score    Level of consciousness: (0)   Alert, keenly responsive    LOC questions: (0)   Answers both questions correctly    LOC  commands: (0)   Performs both tasks correctly    Best gaze: (0)   Normal    Visual: (0)   No visual loss    Facial palsy: (0)   Normal symmetrical movements    Motor arm (left): (0)   No drift    Motor arm (right): (0)   No drift    Motor leg (left): (0)   No drift    Motor leg (right): (0)   No drift    Limb ataxia: (0)   Absent    Sensory: (0)   Normal- no sensory loss    Best language: (0)   Normal- no aphasia    Dysarthria: (1)   Mild to moderate dysarthria    Extinction and inattention: (0)   No abnormality        Total Score:  1        Stroke Mimics were considered (including migraine headache, seizure disorder, hypoglycemia (or hyperglycemia), head or spinal trauma, CNS infection, Toxin ingestion and shock state (e.g. sepsis) .     Medical Decision Making:  Jun Bell is an 89-year-old gentleman presenting to the emergency department via EMS for concerns about stroke.  He had a facial droop as well as slurred speech.  At the time that I evaluated him emergently in stable to his facial droop has resolved and he had mild dysarthria but no aphasia or focal deficits.  A tier 1 code stroke was called due to the last known well being just under 4 hours.  There is no acute occlusion of large vessel on his CT.  There is no bleeding or signs of ischemic stroke.  By the time patient returned from CT his symptoms were very faint.  This was de-escalated.  There is no indication for tPA due to the very mild symptoms.  Neurology was concerned for possible a metabolic or infectious etiology for his symptoms .      In the meantime blood work was obtained.  His CBC and metabolic panel are unremarkable.  His EKG shows a pre-existing right bundle branch block.  His troponin is detectable but again does not complain of any chest pain or anginal equivalents.  On his CTA of his head and neck they did note that there were some patchy opacities in his right upper lung.  A chest x-ray was obtained.  Also concerning for opacities.  I  suspect this may be an aspiration pneumonia given the conversation he had with his wife that he frequently chokes on food.      At this time while the patient may have had a mild stroke at this time he seems to have much resolved his symptoms.  In addition his aspiration pneumonia  may be the cause of his confusion I will treat him with Unasyn to cover him for aspiration pneumonia blood cultures were obtained prior to administration.  We will bring him in to hospital for further management, observation and evaluation.    CRITICAL CARE TIME: 45 minutes    Diagnosis:    ICD-10-CM    1. Confusion  R41.0       2. Pneumonia of right upper lobe due to infectious organism  J18.9       3. Facial droop  R29.810     resolved      4. Slurred speech  R47.81            Discharge Medications:  New Prescriptions    No medications on file          Scribe Disclosure:  I, Neftali Thomas, am serving as a scribe at 4:11 PM on 3/1/2023 to document services personally performed by Stacia Tang MD based on my observations and the provider's statements to me.    3/1/2023   No att. providers found            Stacia Tang MD  03/01/23 1752       Stacia Tang MD  03/01/23 1752

## 2023-03-02 ENCOUNTER — APPOINTMENT (OUTPATIENT)
Dept: MRI IMAGING | Facility: CLINIC | Age: 88
DRG: 177 | End: 2023-03-02
Attending: HOSPITALIST
Payer: COMMERCIAL

## 2023-03-02 ENCOUNTER — APPOINTMENT (OUTPATIENT)
Dept: SPEECH THERAPY | Facility: CLINIC | Age: 88
DRG: 177 | End: 2023-03-02
Attending: HOSPITALIST
Payer: COMMERCIAL

## 2023-03-02 ENCOUNTER — APPOINTMENT (OUTPATIENT)
Dept: PHYSICAL THERAPY | Facility: CLINIC | Age: 88
DRG: 177 | End: 2023-03-02
Attending: HOSPITALIST
Payer: COMMERCIAL

## 2023-03-02 LAB
ALBUMIN UR-MCNC: 20 MG/DL
ANION GAP SERPL CALCULATED.3IONS-SCNC: 10 MMOL/L (ref 7–15)
APPEARANCE UR: CLEAR
ATRIAL RATE - MUSE: 80 BPM
BILIRUB UR QL STRIP: NEGATIVE
BUN SERPL-MCNC: 11.4 MG/DL (ref 8–23)
CALCIUM SERPL-MCNC: 8.7 MG/DL (ref 8.8–10.2)
CHLORIDE SERPL-SCNC: 103 MMOL/L (ref 98–107)
CHOLEST SERPL-MCNC: 185 MG/DL
COLOR UR AUTO: YELLOW
CREAT SERPL-MCNC: 0.99 MG/DL (ref 0.67–1.17)
DEPRECATED HCO3 PLAS-SCNC: 26 MMOL/L (ref 22–29)
DIASTOLIC BLOOD PRESSURE - MUSE: NORMAL MMHG
ERYTHROCYTE [DISTWIDTH] IN BLOOD BY AUTOMATED COUNT: 13.2 % (ref 10–15)
GFR SERPL CREATININE-BSD FRML MDRD: 73 ML/MIN/1.73M2
GLUCOSE BLDC GLUCOMTR-MCNC: 111 MG/DL (ref 70–99)
GLUCOSE SERPL-MCNC: 106 MG/DL (ref 70–99)
GLUCOSE UR STRIP-MCNC: NEGATIVE MG/DL
HCT VFR BLD AUTO: 41.4 % (ref 40–53)
HDLC SERPL-MCNC: 52 MG/DL
HGB BLD-MCNC: 13.5 G/DL (ref 13.3–17.7)
HGB UR QL STRIP: NEGATIVE
INTERPRETATION ECG - MUSE: NORMAL
KETONES UR STRIP-MCNC: 10 MG/DL
LDLC SERPL CALC-MCNC: 120 MG/DL
LEUKOCYTE ESTERASE UR QL STRIP: NEGATIVE
MCH RBC QN AUTO: 30.3 PG (ref 26.5–33)
MCHC RBC AUTO-ENTMCNC: 32.6 G/DL (ref 31.5–36.5)
MCV RBC AUTO: 93 FL (ref 78–100)
MUCOUS THREADS #/AREA URNS LPF: PRESENT /LPF
NITRATE UR QL: NEGATIVE
NONHDLC SERPL-MCNC: 133 MG/DL
P AXIS - MUSE: 51 DEGREES
PH UR STRIP: 7 [PH] (ref 5–7)
PLATELET # BLD AUTO: 142 10E3/UL (ref 150–450)
POTASSIUM SERPL-SCNC: 3.8 MMOL/L (ref 3.4–5.3)
PR INTERVAL - MUSE: 238 MS
PROCALCITONIN SERPL IA-MCNC: 0.15 NG/ML
QRS DURATION - MUSE: 144 MS
QT - MUSE: 412 MS
QTC - MUSE: 475 MS
R AXIS - MUSE: 10 DEGREES
RBC # BLD AUTO: 4.45 10E6/UL (ref 4.4–5.9)
RBC URINE: 1 /HPF
SODIUM SERPL-SCNC: 139 MMOL/L (ref 136–145)
SP GR UR STRIP: 1.01 (ref 1–1.03)
SYSTOLIC BLOOD PRESSURE - MUSE: NORMAL MMHG
T AXIS - MUSE: 31 DEGREES
TRIGL SERPL-MCNC: 65 MG/DL
TROPONIN T SERPL HS-MCNC: 33 NG/L
UROBILINOGEN UR STRIP-MCNC: NORMAL MG/DL
VENTRICULAR RATE- MUSE: 80 BPM
WBC # BLD AUTO: 5.1 10E3/UL (ref 4–11)
WBC URINE: 0 /HPF

## 2023-03-02 PROCEDURE — 258N000003 HC RX IP 258 OP 636: Performed by: HOSPITALIST

## 2023-03-02 PROCEDURE — 92610 EVALUATE SWALLOWING FUNCTION: CPT | Mod: GN

## 2023-03-02 PROCEDURE — 84484 ASSAY OF TROPONIN QUANT: CPT | Performed by: HOSPITALIST

## 2023-03-02 PROCEDURE — 250N000013 HC RX MED GY IP 250 OP 250 PS 637: Performed by: PHYSICIAN ASSISTANT

## 2023-03-02 PROCEDURE — 120N000001 HC R&B MED SURG/OB

## 2023-03-02 PROCEDURE — 81001 URINALYSIS AUTO W/SCOPE: CPT | Performed by: HOSPITALIST

## 2023-03-02 PROCEDURE — 250N000011 HC RX IP 250 OP 636: Performed by: HOSPITALIST

## 2023-03-02 PROCEDURE — 70553 MRI BRAIN STEM W/O & W/DYE: CPT

## 2023-03-02 PROCEDURE — A9585 GADOBUTROL INJECTION: HCPCS | Performed by: EMERGENCY MEDICINE

## 2023-03-02 PROCEDURE — 97161 PT EVAL LOW COMPLEX 20 MIN: CPT | Mod: GP

## 2023-03-02 PROCEDURE — 99232 SBSQ HOSP IP/OBS MODERATE 35: CPT | Performed by: INTERNAL MEDICINE

## 2023-03-02 PROCEDURE — 36415 COLL VENOUS BLD VENIPUNCTURE: CPT | Performed by: HOSPITALIST

## 2023-03-02 PROCEDURE — 82310 ASSAY OF CALCIUM: CPT | Performed by: HOSPITALIST

## 2023-03-02 PROCEDURE — 250N000011 HC RX IP 250 OP 636: Performed by: INTERNAL MEDICINE

## 2023-03-02 PROCEDURE — 97530 THERAPEUTIC ACTIVITIES: CPT | Mod: GP

## 2023-03-02 PROCEDURE — 97116 GAIT TRAINING THERAPY: CPT | Mod: GP

## 2023-03-02 PROCEDURE — 85027 COMPLETE CBC AUTOMATED: CPT | Performed by: HOSPITALIST

## 2023-03-02 PROCEDURE — 255N000002 HC RX 255 OP 636: Performed by: EMERGENCY MEDICINE

## 2023-03-02 PROCEDURE — 84145 PROCALCITONIN (PCT): CPT | Performed by: INTERNAL MEDICINE

## 2023-03-02 RX ORDER — CLOPIDOGREL BISULFATE 75 MG/1
75 TABLET ORAL DAILY
Status: DISCONTINUED | OUTPATIENT
Start: 2023-03-03 | End: 2023-03-03 | Stop reason: HOSPADM

## 2023-03-02 RX ORDER — GADOBUTROL 604.72 MG/ML
7 INJECTION INTRAVENOUS ONCE
Status: COMPLETED | OUTPATIENT
Start: 2023-03-02 | End: 2023-03-02

## 2023-03-02 RX ORDER — AMPICILLIN AND SULBACTAM 2; 1 G/1; G/1
3 INJECTION, POWDER, FOR SOLUTION INTRAMUSCULAR; INTRAVENOUS EVERY 6 HOURS
Status: DISCONTINUED | OUTPATIENT
Start: 2023-03-02 | End: 2023-03-03 | Stop reason: HOSPADM

## 2023-03-02 RX ORDER — CLOPIDOGREL BISULFATE 75 MG/1
300 TABLET ORAL ONCE
Status: COMPLETED | OUTPATIENT
Start: 2023-03-02 | End: 2023-03-02

## 2023-03-02 RX ORDER — ACETAMINOPHEN 650 MG/1
650 SUPPOSITORY RECTAL EVERY 6 HOURS PRN
Status: DISCONTINUED | OUTPATIENT
Start: 2023-03-02 | End: 2023-03-03 | Stop reason: HOSPADM

## 2023-03-02 RX ORDER — ACETAMINOPHEN 325 MG/1
650 TABLET ORAL EVERY 6 HOURS PRN
Status: DISCONTINUED | OUTPATIENT
Start: 2023-03-02 | End: 2023-03-03 | Stop reason: HOSPADM

## 2023-03-02 RX ORDER — ASPIRIN 81 MG/1
81 TABLET ORAL DAILY
Status: DISCONTINUED | OUTPATIENT
Start: 2023-03-02 | End: 2023-03-03 | Stop reason: HOSPADM

## 2023-03-02 RX ADMIN — AMPICILLIN SODIUM AND SULBACTAM SODIUM 3 G: 2; 1 INJECTION, POWDER, FOR SOLUTION INTRAMUSCULAR; INTRAVENOUS at 20:53

## 2023-03-02 RX ADMIN — AMPICILLIN SODIUM AND SULBACTAM SODIUM 3 G: 2; 1 INJECTION, POWDER, FOR SOLUTION INTRAMUSCULAR; INTRAVENOUS at 10:55

## 2023-03-02 RX ADMIN — REMDESIVIR 100 MG: 100 INJECTION, POWDER, LYOPHILIZED, FOR SOLUTION INTRAVENOUS at 21:52

## 2023-03-02 RX ADMIN — ASPIRIN 81 MG: 81 TABLET, COATED ORAL at 16:32

## 2023-03-02 RX ADMIN — GADOBUTROL 7 ML: 604.72 INJECTION INTRAVENOUS at 02:17

## 2023-03-02 RX ADMIN — SODIUM CHLORIDE 50 ML: 9 INJECTION, SOLUTION INTRAVENOUS at 21:53

## 2023-03-02 RX ADMIN — AMPICILLIN SODIUM AND SULBACTAM SODIUM 3 G: 2; 1 INJECTION, POWDER, FOR SOLUTION INTRAMUSCULAR; INTRAVENOUS at 15:14

## 2023-03-02 RX ADMIN — CLOPIDOGREL BISULFATE 300 MG: 75 TABLET ORAL at 16:32

## 2023-03-02 RX ADMIN — REMDESIVIR 200 MG: 100 INJECTION, POWDER, LYOPHILIZED, FOR SOLUTION INTRAVENOUS at 00:08

## 2023-03-02 RX ADMIN — SODIUM CHLORIDE 50 ML: 9 INJECTION, SOLUTION INTRAVENOUS at 01:33

## 2023-03-02 ASSESSMENT — ACTIVITIES OF DAILY LIVING (ADL)
ADLS_ACUITY_SCORE: 35

## 2023-03-02 NOTE — PROGRESS NOTES
03/02/23 1520   Appointment Info   Signing Clinician's Name / Credentials (PT) Mame Perales, PT, DPT   Living Environment   People in Home spouse   Current Living Arrangements house   Home Accessibility stairs to enter home   Number of Stairs, Main Entrance 2;3   Stair Railings, Main Entrance none   Transportation Anticipated family or friend will provide;car, drives self   Living Environment Comments Spouse has been staying w/ their son to take care of him.   Self-Care   Usual Activity Tolerance good   Current Activity Tolerance moderate   Regular Exercise Yes   Activity/Exercise Type walking   Equipment Currently Used at Home none   Fall history within last six months yes   Number of times patient has fallen within last six months 1  (possibly more in the same day w/ this admission)   Activity/Exercise/Self-Care Comment Patient reports independence at baseline without device. His wife has been staying w/ their son in the last couple months to take care of him.   General Information   Onset of Illness/Injury or Date of Surgery 03/01/23   Referring Physician Brit Allison MD   Patient/Family Therapy Goals Statement (PT) to get better   Pertinent History of Current Problem (include personal factors and/or comorbidities that impact the POC) Per chart: Jun Bell is a 89 year old male who is brought in by family due to sudden onset of dysarthria and left facial droop.      He has a PMHx significant for glaucoma and nephrolithiasis.     He was brought into the ED by family after he was thought to have slurred speech and a left facial droop. Patient lives at home with his daughter. His wife, although does not live with him, notes she speaks with him everyday. History was obtained from both wife and patient.   Per wife, patient was noted to be last known well about noon time, seen by his daughter. Afterwards, he was noted to have slurred speech and left facial droop prompting them to call EMS.      Per  patient, he had just finished shoveling the druiveway of  2 of his neighbors. He returned home and subsequently went to walk his dog. He reports falling about 5 times enroute back home, and then had difficulty climbing up the stairs leading into his home. The difficulty in ambulation resolved once he was inside his home. Per family, they question if this actually happened as patient was dry when he got back home despite him claiming he fell into the snow those 5 times.      In the ED, stroke code alert was called.  Patient was evaluated by neurology and deemed not to be a tPA candidate. No facial droop was observed and his speech was slightly slurred although has not returned to normal.  CT head was negative for any acute intracranial abnormalities.  CTA head was negative for any occlusion or stenosis   Existing Precautions/Restrictions fall   Weight-Bearing Status - LLE full weight-bearing   Weight-Bearing Status - RLE full weight-bearing   Cognition   Orientation Status (Cognition) oriented x 4   Cognitive Status Comments [place]; pt needs repeated cues frequently. Unclear if this is cognition or hearing. Pt w/ decreased environmental awareness at times (furniture and lines/tubes).   Pain Assessment   Patient Currently in Pain No   Integumentary/Edema   Integumentary/Edema no deficits were identifed   Posture    Posture Kyphosis   Posture Comments can improve w/ cuing   Range of Motion (ROM)   Range of Motion ROM is WFL   Strength (Manual Muscle Testing)   Strength Comments MMT reveals 5/5 B LE strength   Bed Mobility   Bed Mobility no deficits identified   Comment, (Bed Mobility) moves well in/out of bed   Balance   Balance Comments 45/56 on Bell Balance Scale (<=45 points indicative of falls risk)   Sensory Examination   Sensory Perception patient reports no sensory changes   Sensory Perception Comments light touch even bilaterally. Pt reports no vision changes or dizziness.   Coordination   Coordination no deficits  were identified   Clinical Impression   Criteria for Skilled Therapeutic Intervention Yes, treatment indicated   PT Diagnosis (PT) impaired functional mobility   Influenced by the following impairments decreased activity tolerance   Functional limitations due to impairments bed mob, transfers, ambulation, stairs   Clinical Presentation (PT Evaluation Complexity) Stable/Uncomplicated   Clinical Presentation Rationale clinical judgement   Clinical Decision Making (Complexity) low complexity   Planned Therapy Interventions (PT) gait training;balance training;bed mobility training;home exercise program;neuromuscular re-education;postural re-education;patient/family education;stair training;strengthening;transfer training   Risk & Benefits of therapy have been explained evaluation/treatment results reviewed;care plan/treatment goals reviewed;risks/benefits reviewed;current/potential barriers reviewed;participants voiced agreement with care plan;participants included;patient   PT Total Evaluation Time   PT Eval, Low Complexity Minutes (97558) 20   Physical Therapy Goals   PT Frequency Daily   PT Predicted Duration/Target Date for Goal Attainment 03/09/23   PT Goals Bed Mobility;Transfers;Gait;Stairs   PT: Bed Mobility Independent   PT: Transfers Modified independent;Sit to/from stand;Bed to/from chair   PT: Gait Independent;Greater than 200 feet   PT: Stairs Supervision/stand-by assist;2 stairs   Interventions   Interventions Quick Adds Gait Training;Therapeutic Activity   Therapeutic Activity   Therapeutic Activities: dynamic activities to improve functional performance Minutes (48117) 15   Symptoms Noted During/After Treatment None   Treatment Detail/Skilled Intervention Supine to seated EOB w/ SBA. Pt needing cuing repeated throughout session, unclear if because [place] or cognition. Also demonstrates poor envionrmental awareness at times w/ oxygen line and IV pole. Stood to use toilet w/ supervision; needed verbal cuing  for sequencing of hand washing. Transfered to chair at end of session, pt requesting chair be moved to window so he was able to stand w/ holding IV pole for several minutes without assist.   Gait Training   Gait Training Minutes (07465) 10   Symptoms Noted During/After Treatment (Gait Training) none   Treatment Detail/Skilled Intervention Ambulation in room without device, 15ft laps x 5 (limited space in COVID room). Also worked on stepping over box, needs min-mod assist to avoid kicking it.   [place] Level (Gait Training) contact guard   Physical Assistance Level (Gait Training) verbal cues   Weight Bearing (Gait Training) full weight-bearing   PT Discharge Planning   PT Plan progress activity tolerance w/ gait without device. higher level balance tasks. stairs.   PT Discharge Recommendation (DC Rec) home with assist   PT Rationale for DC Rec Patient appears to be close to his baseline for functional mobility. He's able to walk hallway distance without device without unsteadiness and tolerated well. Will defer to OT for potential cognition deficits.   PT Brief overview of current status SBA w/ verbal cuing for bed mob, transfers, and ambulation without device   Total Session Time   Timed Code Treatment Minutes 25   Total Session Time (sum of timed and untimed services) 45

## 2023-03-02 NOTE — CONSULTS
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    He presented to [facility] emergency department 3/1/23 after he returned home from a walk and daughter noticed that he had significant slurred speech and confusion saying that he fell several times on his walk but there was no evidence of that on his body or any wet clothes and it was wet outside, it was noticed that he had a L facial droop and possibly L arm weakness as well (his daughter has been staying with him while patient's wife is caring for another family member). In ED all symptoms resolved other than slight slurred speech. CT/CTA negative for concerning pathology.    MRI was negative for acute stroke. He was found to have COVID-19 with some evidence of RUL groundglass opacities on imaging. Stroke team consulted for further recommendations.    He was evaluated this AM and speech at baseline per patient and his wife. Discussed possible TIA given the report of L sided weakness as well. Started on DAPT with ASA and plavix. Recommended addition of statin given . Discussed plan as outlined below.    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 60  "years 1 point 1   Blood Pressure     -SBP ? 140 or DBP ?  90    1 point 0   Clinical Features    -Unilateral weakness   -Speech disturbance w/o weakness    -Other    2 points  1 point    0 points 2   Duration of symptoms    ?60 minutes    10-59 minutes    <10 minutes   2 points  1 point  0 points 1   Diabetes  1 point 0   Patient s ABCD2 Score (0-7) = 4       Impression  Transient slurred speech, L facial droop, L arm weakness, confusion in setting of COVID-19 infection, MRI negative for stroke, no concerning vessel pathology, suspect Transient ischemic attack (TIA), ABCD2 score 4, evaluate for cardioembolic sources    Recommendations  - Use orderset: \"Ischemic Stroke Routine Admission\" or \"Ischemic Stroke No Thrombolytics/No Thrombectomy ICU Admission\"  -TTE (with bubble study if 60 yrs or less)   -Neuro checks and vitals every 4 hours  - goal SBP <180 while inpatient  -long term outpatient blood pressure goal <130/80, recommend home monitoring twice daily in AM and PM, keep log and bring to f/u with PCP   -ASA 81 mg daily indefinitely  -plavix 300 mg daily x 1 then 75 mg daily x 21 days  -given no evidence of stroke on MRI and does not appear to have a severe COVID infection, do not recommend anticoagulation at this time   -was 160 lb in 11/2021, now 146 lb, could consider CT c/a/p to rule out malignancy  -, recommend starting at Lipitor 20 mg daily given his age, follow-up with PCP for titration to goal LDL 40-70, <40 increases risk of Intracranial hemorrhage  -Blood glucose monitoring, Hgb A1c 6.0%, new diagnosis prediabetes (goal <7% for secondary stroke prevention), follow-up with PCP  -Mediterranean diet can be beneficial for overall decreased cardiovascular risk, please print hand out for patient at discharge  -telemetry, 30 day CardioNet monitoring at discharge if no Afib found on telemetry (ordered)  -PT/OT/SPT   -Smoking screen: no significant smoking history  -Sleep Apnea screen: patient and his " "wife deny that he has snores or any history of sleep apnea  -Euthermia, euglycemia, eunatremia   -Stroke Education  -Stroke Class per [facility] ([facility])    Patient Follow-up    -f/u with PCP in 1-2 weeks  -f/u with neurology team in 6-8 weeks (ordered)    Thank you for this consult. We will follow peripherally for results of TTE. If no concerns then we will sign off.     Cristin Newsome PA-C  Vascular Neurology    To page me or covering stroke neurology team member, click here: [facility]  Choose \"On Call\" tab at top, then select \"NEUROLOGY/ALL SITES\" from middle drop-down box, press Enter, then look for \"stroke\" or \"telestroke\" for your site.    _____________________________________________________    Clinically Significant Risk Factors Present on Admission               # Coagulation Defect: INR = 1.19 (Ref range: 0.85 - 1.15) and/or PTT = 30 Seconds (Ref range: 22 - 38 Seconds), will monitor for bleeding                 Past Medical History   No past medical history on file.  Past Surgical History   No past surgical history on file.  Medications   Home Meds  Prior to Admission medications    Not on File       Scheduled Meds    remdesivir  100 mg Intravenous Q24H    And     sodium chloride 0.9%  50 mL Intravenous Q24H     ampicillin-sulbactam  3 g Intravenous Q6H     sodium chloride (PF)  3 mL Intracatheter Q8H     sodium chloride (PF)  3 mL Intracatheter Q8H       Infusion Meds    - MEDICATION INSTRUCTIONS -       - MEDICATION INSTRUCTIONS -         PRN Meds  acetaminophen **OR** acetaminophen, lidocaine 4%, lidocaine 4%, lidocaine (buffered or not buffered), lidocaine (buffered or not buffered), - MEDICATION INSTRUCTIONS -, - MEDICATION INSTRUCTIONS -, melatonin, ondansetron **OR** ondansetron, sodium chloride (PF), sodium chloride (PF)    Allergies   No Known Allergies  Family History   No family history on file.  Social History        Review of Systems   The 10 point Review of Systems is negative other " than noted in the HPI or here.        PHYSICAL EXAMINATION   Temp:  [99  F (37.2  C)-99.8  F (37.7  C)] 99  F (37.2  C)  Pulse:  [56-92] 56  Resp:  [13-25] 16  BP: (108-133)/(53-78) 115/65  SpO2:  [92 %-97 %] 97 %    General Exam  General:  patient lying in bed without any acute distress    HEENT:  normocephalic/atraumatic  Pulmonary:  no respiratory distress    Neuro Exam  Mental Status:  alert, oriented x 3, follows commands, speech clear and fluent, naming and repetition normal  Cranial Nerves:  visual fields intact, PERRL, EOMI with normal smooth pursuit, facial sensation intact and symmetric, facial movements symmetric, hearing not formally tested but intact to conversation, palate elevation symmetric and uvula midline, tongue protrusion midline, possible mild dysarthria but patient and wife state his speech is back to baseline  Motor:  normal muscle tone and bulk, no abnormal movements, able to move all limbs spontaneously, strength 4/5 throughout upper and lower extremities, no pronator drift  Reflexes:  toes down-going  Sensory:  light touch sensation intact and symmetric throughout upper and lower extremities, no extinction on double simultaneous stimulation   Coordination:  normal finger-to-nose and heel-to-shin bilaterally without dysmetria  Station/Gait:  deferred    Dysphagia Screen  Per SPT    Stroke Scales    NIHSS  1a. Level of Consciousness 0-->Alert, keenly responsive   1b. LOC Questions 0-->Answers both questions correctly   1c. LOC Commands 0-->Performs both tasks correctly   2.   Best Gaze 0-->Normal   3.   Visual 0-->No visual loss   4.   Facial Palsy 0-->Normal symmetrical movements   5a. Motor Arm, Left 0-->No drift, limb holds 90 (or 45) degrees for full 10 secs   5b. Motor Arm, Right 0-->No drift, limb holds 90 (or 45) degrees for full 10 secs   6a. Motor Leg, Left 0-->No drift, leg holds 30 degree position for full 5 secs   6b. Motor Leg, right 0-->No drift, leg holds 30 degree position  for full 5 secs   7.   Limb Ataxia 0-->Absent   8.   Sensory 0-->Normal, no sensory loss   9.   Best Language 0-->No aphasia, normal   10. Dysarthria (S) 1-->Mild-to-moderate dysarthria, patient slurs at least some words and, at worst, can be understood with some difficulty (possible slight dysarthria but he and wife state it is at baseline)   11. Extinction and Inattention  0-->No abnormality   Total 1 (03/02/23 1542)       Modified [place] Score (Pre-morbid)  2 - Slight disability.  Able to look after own affairs without assistance, but unable to carry out all previous activities.     Imaging  I personally reviewed all imaging; relevant findings per HPI.    Labs Data   CBC  Recent Labs   Lab 03/02/23  0736 03/01/23  1558   WBC 5.1 9.4   RBC 4.45 4.90   HGB 13.5 14.8   HCT 41.4 45.9   * 205     Basic Metabolic Panel   Recent Labs   Lab 03/02/23  0736 03/01/23  1558    139   POTASSIUM 3.8 4.0   CHLORIDE 103 100   CO2 26 29   BUN 11.4 10.5   CR 0.99 1.05   * 157*   SHARON 8.7* 9.1     Liver Panel  Recent Labs   Lab 03/01/23  1558   PROTTOTAL 6.9   ALBUMIN 4.0   BILITOTAL 0.5   ALKPHOS 107   AST 17   ALT 7*     INR    Recent Labs   Lab Test 03/01/23  1558   INR 1.19*      Lipid Profile    Recent Labs   Lab Test 03/01/23  1558   CHOL 185   HDL 52   *   TRIG 65     A1C    Recent Labs   Lab Test 03/01/23  1558   A1C 6.0*     Troponin    Recent Labs   Lab 03/02/23  0027 03/01/23  1558   CTROPT 33* 27*          Stroke Consult Data Data   This was a non-emergent, non-telestroke consult.  I have personally spent a total of 70 minutes providing care today, time spent in reviewing medical records and reviewing tests, examining the patient and obtaining history, coordination of care, and discussion with the patient and/or family regarding diagnostic results, prognosis, symptom management, risks and benefits of management options, and development of plan of care. Greater than 50% was spent in counseling  and coordination of care.

## 2023-03-02 NOTE — ED NOTES
[facility]  ED Nurse Handoff Report    ED Chief complaint: Stroke Symptoms      ED Diagnosis:   Final diagnoses:   Confusion   Pneumonia of right upper lobe due to infectious organism   Facial droop - resolved   Slurred speech       Code Status: see admitting orders    Allergies: No Known Allergies    Patient Story: in with slurred speech, left sided facial droop  Focused Assessment:  No apparent distress. Alert and oriented, occasionally disoriented to situation. Somewhat weak     Treatments and/or interventions provided: labs, abx administered, see imaging  Patient's response to treatments and/or interventions: VSS    To be done/followed up on inpatient unit:  see admitting orders    Does this patient have any cognitive concerns?: Forgetful    Activity level - Baseline/Home:  Stand with Assist  Activity Level - Current:   Stand with Assist    Patient's Preferred language: English   Needed?: No    Isolation: none at this time  Infection:  NA  Patient tested for COVID 19 prior to admission: YES  Bariatric?: No    Vital Signs:   Vitals:    03/01/23 1615 03/01/23 1616 03/01/23 1617 03/01/23 1618   BP:       Pulse: 92 84 87 87   Resp: 25 13 15 23   Temp:       TempSrc:       SpO2: 93% 93% 93% 94%   Weight:           Cardiac Rhythm:     Was the PSS-3 completed:   Yes  What interventions are required if any?               Family Comments: wife at bedside  OBS brochure/video discussed/provided to patient/family: na              Name of person given brochure if not patient: na              Relationship to patient: na    For the majority of the shift this patient's behavior was Green.   Behavioral interventions performed were care explained, care in rounding.    ED NURSE PHONE NUMBER: [id number]

## 2023-03-02 NOTE — PROGRESS NOTES
"[facility]  Hospitalist Progress Note  Davi Augustin MD  03/02/2023    Assessment & Plan   Jun Bell is a 89 year old male admitted on 3/1/2023 for suspected stroke. He was found to be covid positive in the ED.     # Confirmed COVID-19 infection    # Viral Pneumonia secondary to COVID-19 infection     Symptom Onset Date used to determine duration of isolation.  If unsure, enter \"unknown\"   Date of 1st Positive Test 03/01/23   Vaccination Status Fully Vaccinated         - COVID-19 special precautions, continuous pulse-ox  - Oxygen: Not on oxygen  - Labs: Standard COVID admission labs ordered (CBC with diff, CMP, INR, D-dimer, CRP).   - Imaging: CTA head revealed ground glass opacities in the right upper lungs  - Breathing treatments: no inhalers needed; avoid nebulizers in favor of MDIs   - IV fluids: not indicated at this time  - Antibiotics: concern for aspiration pneumonia, will empirically start on ceftriaxone and azithromycin   - COVID-Focused Medications: Remdesivir x 3 days or until hospital discharge, started on 03/01/23  - DVT Prophylaxis:         - At high risk of thrombotic complications due to COVID-19 (DDimer = N/A )         - hold off anticoagulation pending d-dimer.       Metabolic encephalopathy vs TIA  Left facial droop  Slurred speech  *Symptoms now resolved  *Stroke alert in the ED, neurology denied them appropriate for tPA  *CT head, CTA head and neck were unremarkable  --Neurochecks every 4 hours  -- MRI brain with and without contrast negative for stroke  --NPO pending swallow eval  --PT/OT/SLP  --Echo pending    Diet: NPO for Medical/Clinical Reasons Except for: Meds until swallow screen complete   DVT Prophylaxis: Pneumatic Compression Devices  [place] Catheter: Not present  Lines: None     Cardiac Monitoring: None  Code Status:   full code        Clinically Significant Risk Factors Present on Admission       # Coagulation Defect: INR = 1.19 (Ref range: 0.85 - " "1.15) and/or PTT = 30 Seconds (Ref range: 22 - 38 Seconds), will monitor for bleeding               Disposition Plan   -- home vs tcu     Expected Discharge Date:         Interval History   -- chart reviewed  -- MRI negative for stroke    -Data reviewed today: I reviewed all new labs and imaging over the last 24 hours. I personally reviewed no images or EKG's today.    Physical Exam    , Blood pressure 115/57, pulse 74, temperature 99.8  F (37.7  C), temperature source Temporal, resp. rate 19, height 1.727 m (5' 7.99\"), weight 66.6 kg (146 lb 13.2 oz), SpO2 92 %.  Vitals:    03/01/23 1555   Weight: 66.6 kg (146 lb 13.2 oz)     Vital Signs with Ranges  Temp:  [99.8  F (37.7  C)] 99.8  F (37.7  C)  Pulse:  [74-92] 74  Resp:  [13-25] 19  BP: (108-133)/(53-78) 115/57  SpO2:  [92 %-97 %] 92 %  I/O's Last 24 hours  No intake/output data recorded.    Constitutional: Awake, alert, cooperative, no apparent distress  Respiratory: Clear to auscultation bilaterally, no crackles or wheezing  Cardiovascular: Regular rate and rhythm, normal S1 and S2   GI: Normal bowel sounds, soft, non-distended, non-tender  Skin/Integumen: No rashes, no cyanosis, no edema  Other:      Medications   All medications were reviewed.    - MEDICATION INSTRUCTIONS -       - MEDICATION INSTRUCTIONS -         remdesivir  100 mg Intravenous Q24H    And     sodium chloride 0.9%  50 mL Intravenous Q24H     ampicillin-sulbactam  3 g Intravenous Q6H     sodium chloride (PF)  3 mL Intracatheter Q8H     sodium chloride (PF)  3 mL Intracatheter Q8H        Data   Recent Labs   Lab 03/02/23  0736 03/01/23  1558   WBC 5.1 9.4   HGB 13.5 14.8   MCV 93 94   * 205   INR  --  1.19*    139   POTASSIUM 3.8 4.0   CHLORIDE 103 100   CO2 26 29   BUN 11.4 10.5   CR 0.99 1.05   ANIONGAP 10 10   SHARON 8.7* 9.1   * 157*   ALBUMIN  --  4.0   PROTTOTAL  --  6.9   BILITOTAL  --  0.5   ALKPHOS  --  107   ALT  --  7*   AST  --  17       Recent Results (from the " past 24 hour(s))   CT Head w/o Contrast    Narrative    CT SCAN OF THE HEAD WITHOUT CONTRAST   3/1/2023 4:04 PM     HISTORY: Code stroke to evaluate for potential thrombolysis and  thrombectomy. Altered mental status.    TECHNIQUE: Axial images of the head and coronal reformations without  IV contrast material. Radiation dose for this scan was reduced using  automated exposure control, adjustment of the mA and/or kV according  to patient size, or iterative reconstruction technique.    COMPARISON: Head CT 11/24/2021.    FINDINGS: There is no evidence of intracranial hemorrhage, mass, acute  infarct or anomaly. The ventricles are normal in size, shape and  configuration. The brain parenchyma and subarachnoid spaces are  normal.     Complete opacification of the right maxillary sinus. The bony  calvarium and bones of the skull base appear intact.       Impression    IMPRESSION: No evidence of acute intracranial hemorrhage, mass, or  herniation.      DON SANCHEZ MD         SYSTEM ID:  [id number]   CTA Head Neck with Contrast    Narrative    CT ANGIOGRAM OF THE HEAD AND NECK WITH CONTRAST  3/1/2023 4:09 PM     HISTORY: Code stroke to evaluate for potential thrombolysis and  thrombectomy. Altered mental status.    TECHNIQUE: CT angiography with an injection of 75 mL Isovue-370 IV  with scans through the head and neck. Images were transferred to a  separate 3-D workstation where multiplanar reformations and 3-D images  were created. Estimates of carotid stenoses are made relative to the  distal internal carotid artery diameters except as noted. Radiation  dose for this scan was reduced using automated exposure control,  adjustment of the mA and/or kV according to patient size, or iterative  reconstruction technique.    COMPARISON: None.     CT HEAD FINDINGS: No contrast enhancing lesions. Cerebral blood flow  is grossly normal.     CT ANGIOGRAM HEAD FINDINGS:  The major intracranial arteries including  the proximal  branches of the anterior cerebral, middle cerebral, and  posterior cerebral arteries appear patent without vascular cutoff. No  aneurysm identified. No significant stenosis. Venous circulation is  unremarkable.     CT ANGIOGRAM NECK FINDINGS:   Normal origin of the great vessels from the aortic arch.     Right carotid artery: The right common and internal carotid arteries  are patent. Mild atherosclerotic disease at the carotid bifurcation  and proximal internal carotid artery without significant stenosis by  NASCET criteria.     Left carotid artery: The left common and internal carotid arteries are  patent. Mild atherosclerotic disease at the carotid bifurcation and  proximal internal carotid artery without significant stenosis by  NASCET criteria.     Vertebral arteries: Vertebral arteries are patent without evidence of  dissection. No significant stenosis.     Other findings: Patchy groundglass opacities in the visualized right  upper lung. There are degenerative changes in the spine.    Large pocket of heterogeneous debris projecting inferiorly from the  hypopharynx, this is favored represent a Zenker's diverticulum.      Impression    IMPRESSION:   1. Patent arteries in the neck without evidence of dissection. Mild  atherosclerotic disease in the carotid arteries bilaterally without  significant stenosis by NASCET criteria.  2. Patent proximal major intracranial arteries without vascular  cutoff. No significant stenosis. No aneurysm identified.   3. Patchy groundglass opacities in the visualized right upper lung.  This could represent pneumonia.  4. 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    Results discussed with Stacia Tang at 4:21 PM on 3/1/2023.     DON SANCHEZ MD         SYSTEM ID:  [id number]   Chest XR,  PA & LAT    Narrative    EXAM: XR CHEST 2 VIEWS  LOCATION: [facility]  DATE/TIME: 3/1/2023 4:41 PM    INDICATION: Confusion.  COMPARISON: None.      Impression     IMPRESSION:     Although the anterior right first rib projects over this region, suspicion for subtle right upper lung opacities; infectious / inflammatory process not excluded. Minimal left base bandlike opacity, probably atelectasis.     No pleural effusion or pneumothorax. Normal heart size. Aortic atherosclerosis.       MR Brain w/o & w Contrast    Narrative    EXAM: MR BRAIN W/O and W CONTRAST  LOCATION: [facility]  DATE/TIME: 3/2/2023 2:17 AM    INDICATION: Neuro deficit. Altered mental status.  COMPARISON: 03/01/2023  CONTRAST: 7mL Gadavist   TECHNIQUE: Routine multiplanar multisequence head MRI without and with intravenous contrast.    FINDINGS:  INTRACRANIAL CONTENTS: No acute or subacute infarct. No mass, acute hemorrhage, or extra-axial fluid collections. Scattered nonspecific T2/FLAIR hyperintensities within the cerebral white matter most consistent with mild chronic microvascular ischemic   change. Mild generalized cerebral atrophy. No hydrocephalus. Normal position of the cerebellar tonsils. No pathologic contrast enhancement.    SELLA: No abnormality accounting for technique.    OSSEOUS STRUCTURES/SOFT TISSUES: Normal marrow signal. The major intracranial vascular flow voids are maintained.     ORBITS: Prior left cataract surgery. Visualized portions of the orbits are otherwise unremarkable.     SINUSES/MASTOIDS: Complete opacification of the right maxillary sinus. Moderate mucosal thickening of the ethmoid air cells. Trace right mastoid effusion.       Impression    IMPRESSION:  1.  No acute intracranial process.  2.  Generalized brain atrophy and presumed microvascular ischemic changes as detailed above.       Davi Augustin MD  Text Page  (7am to 6pm)

## 2023-03-02 NOTE — ED NOTES
I SPOKE WITH FAMILY ABOUT COVID POSITIVE VISITOR RESTRICTIONS. PT'S WIFE UNDERSTANDS.    Problem: Potential for Falls  Goal: Patient will remain free of falls  Description  INTERVENTIONS:  - Assess patient frequently for physical needs  -  Identify cognitive and physical deficits and behaviors that affect risk of falls  -  Stephan fall precautions as indicated by assessment   - Educate patient/family on patient safety including physical limitations  - Instruct patient to call for assistance with activity based on assessment  - Modify environment to reduce risk of injury  - Consider OT/PT consult to assist with strengthening/mobility  Outcome: Progressing     Problem: Prexisting or High Potential for Compromised Skin Integrity  Goal: Skin integrity is maintained or improved  Description  INTERVENTIONS:  - Identify patients at risk for skin breakdown  - Assess and monitor skin integrity  - Assess and monitor nutrition and hydration status  - Monitor labs   - Assess for incontinence   - Turn and reposition patient  - Assist with mobility/ambulation  - Relieve pressure over bony prominences  - Avoid friction and shearing  - Provide appropriate hygiene as needed including keeping skin clean and dry  - Evaluate need for skin moisturizer/barrier cream  - Collaborate with interdisciplinary team   - Patient/family teaching  - Consider wound care consult   Outcome: Progressing     Problem: Nutrition/Hydration-ADULT  Goal: Nutrient/Hydration intake appropriate for improving, restoring or maintaining nutritional needs  Description  Monitor and assess patient's nutrition/hydration status for malnutrition  Collaborate with interdisciplinary team and initiate plan and interventions as ordered  Monitor patient's weight and dietary intake as ordered or per policy  Utilize nutrition screening tool and intervene as necessary  Determine patient's food preferences and provide high-protein, high-caloric foods as appropriate       INTERVENTIONS:  - Monitor oral intake, urinary output, labs, and treatment plans  - Assess nutrition and hydration status and recommend course of action  - Evaluate amount of meals eaten  - Assist patient with eating if necessary   - Allow adequate time for meals  - Recommend/ encourage appropriate diets, oral nutritional supplements, and vitamin/mineral supplements  - Order, calculate, and assess calorie counts as needed  - Recommend, monitor, and adjust tube feedings and TPN/PPN based on assessed needs  - Assess need for intravenous fluids  - Provide specific nutrition/hydration education as appropriate  - Include patient/family/caregiver in decisions related to nutrition  Outcome: Progressing     Problem: GASTROINTESTINAL - ADULT  Goal: Maintains adequate nutritional intake  Description  INTERVENTIONS:  - Monitor percentage of each meal consumed  - Identify factors contributing to decreased intake, treat as appropriate  - Assist with meals as needed  - Monitor I&O, weight, and lab values if indicated  - Obtain nutrition services referral as needed  Outcome: Progressing     Problem: METABOLIC, FLUID AND ELECTROLYTES - ADULT  Goal: Electrolytes maintained within normal limits  Description  INTERVENTIONS:  - Monitor labs and assess patient for signs and symptoms of electrolyte imbalances  - Administer electrolyte replacement as ordered  - Monitor response to electrolyte replacements, including repeat lab results as appropriate  - Instruct patient on fluid and nutrition as appropriate  Outcome: Progressing  Goal: Fluid balance maintained  Description  INTERVENTIONS:  - Monitor labs   - Monitor I/O and WT  - Instruct patient on fluid and nutrition as appropriate  - Assess for signs & symptoms of volume excess or deficit  Outcome: Progressing  Goal: Glucose maintained within target range  Description  INTERVENTIONS:  - Monitor Blood Glucose as ordered  - Assess for signs and symptoms of hyperglycemia and hypoglycemia  - Administer ordered medications to maintain glucose within target range  - Assess nutritional intake and initiate nutrition service referral as needed  Outcome: Progressing

## 2023-03-02 NOTE — PROGRESS NOTES
"Speech-Language Pathology: Clinical Swallow Evaluation     03/02/23 1400   Appointment Info   Signing Clinician's Name / Credentials (SLP) Marilu Isabel MA, CCC-SLP   General Information   Onset of Illness/Injury or Date of Surgery 03/01/23   Referring Physician Brit Allison MD   Pertinent History of Current Problem Per ordering provier \"   Jun Bell is a 89 year old male who is brought in by family due to sudden onset of dysarthria and left facial droop.      He has a PMHx significant for glaucoma and nephrolithiasis.     He was brought into the ED by family after he was thought to have slurred speech and a left facial droop. Patient lives at home with his daughter. His wife, although does not live with him, notes she speaks with him everyday. History was obtained from both wife and patient.   Per wife, patient was noted to be last known well about noon time, seen by his daughter. Afterwards, he was noted to have slurred speech and left facial droop prompting them to call EMS.      Per patient, he had just finished shoveling the druiveway of  2 of his neighbors. He returned home and subsequently went to walk his dog. He reports falling about 5 times enroute back home, and then had difficulty climbing up the stairs leading into his home. The difficulty in ambulation resolved once he was inside his home. Per family, they question if this actually happened as patient was dry when he got back home despite him claiming he fell into the snow those 5 times.      In the ED, stroke code alert was called.  Patient was evaluated by neurology and deemed not to be a tPA candidate. No facial droop was observed and his speech was slightly slurred although has not returned to normal.  CT head was negative for any acute intracranial abnormalities.  CTA head was negative for any occlusion or stenosis\".   Type of Evaluation   Type of Evaluation Swallow Evaluation   Oral Motor   Oral Musculature generally intact "   Dentition (Oral Motor)   Dentition (Oral Motor) natural dentition;adequate dentition   Facial Symmetry (Oral Motor)   Facial Symmetry (Oral Motor) WNL   Lip Function (Oral Motor)   Lip Range of Motion (Oral Motor) WNL   Lip Sensitivity (Oral Motor) intact   Lip Strength (Oral Motor) WNL   Tongue Function (Oral Motor)   Tongue Strength (Oral Motor) WNL   Tongue Coordination/Speed (Oral Motor) WNL   Tongue ROM (Oral Motor) WNL   Jaw Function (Oral Motor)   Jaw Function (Oral Motor) WNL   Cough/Swallow/Gag Reflex (Oral Motor)   Soft Palate/Velum (Oral Motor) WNL   Volitional Throat Clear/Cough (Oral Motor) WNL   Volitional Swallow (Oral Motor) WNL   Vocal Quality/Secretion Management (Oral Motor)   Vocal Quality (Oral Motor) WNL   Secretion Management (Oral Motor) WNL   General Swallowing Observations   Past History of Dysphagia None per EMR or report.   Current Diet/Method of Nutritional Intake (General Swallowing Observations, NIS) NPO   Swallowing Evaluation Clinical swallow evaluation   Clinical Swallow Evaluation   Clinical Swallow Evaluation Textures Trialed thin liquids;pureed;solid foods   Clinical Swallow Eval: Thin Liquid Texture Trial   Mode of Presentation, Thin Liquids cup;straw;self-fed   Volume of Liquid or Food Presented 12oz   Oral Phase of Swallow WFL   Pharyngeal Phase of Swallow intact   Diagnostic Statement No overt s/s aspiration   Clinical Swallow Evaluation: Puree Solid Texture Trial   Mode of Presentation, Puree spoon;self-fed   Volume of Puree Presented 4oz   Oral Phase, Puree WFL   Pharyngeal Phase, Puree intact   Diagnostic Statement No overt s/s aspiration   Clinical Swallow Evaluation: Solid Food Texture Trial   Mode of Presentation self-fed   Volume Presented x1 cracker   Oral Phase WFL   Pharyngeal Phase intact   Diagnostic Statement No overt s/s aspiration. Pt naturally used liquid wash with solids.   Esophageal Phase of Swallow   Patient reports or presents with symptoms of  esophageal dysphagia No   Esophageal comments Large Zenker's noted on the head and neck CT.   Swallowing Recommendations   Diet Consistency Recommendations regular diet;thin liquids (level 0)   Supervision Level for Intake patient independent   Swallowing Maneuver Recommendations alternate food and liquid intake   Monitoring/Assistance Required (Eating/Swallowing) monitor for cough or change in vocal quality with intake   Recommended Feeding/Eating Techniques (Swallow Eval) maintain upright posture during/after eating for 30 minutes   Medication Administration Recommendations, Swallowing (SLP) One at a time   Instrumental Assessment Recommendations instrumental evaluation not recommended at this time   Comment, Swallowing Recommendations Clinical Swallow Study completed. Patient had no s/s aspiration with any intake. Oral motor function was WNL. Mastication was WNL. Hyolaryngeal elevation appears present upon visualization and palpation. Recommend diet of Regular and Thin with strategies of upright posture, small bites/sip, and alternate liquids/solids. SLP to follow for diet tolerance and strategy use.   General Therapy Interventions   Planned Therapy Interventions Dysphagia Treatment   Dysphagia treatment Instruction of safe swallow strategies   Clinical Impression   Criteria for Skilled Therapeutic Interventions Met (SLP Eval) Yes, treatment indicated   Risks & Benefits of therapy have been explained evaluation/treatment results reviewed;care plan/treatment goals reviewed;risks/benefits reviewed;current/potential barriers reviewed;participants included;participants voiced agreement with care plan;patient   Clinical Impression Comments Speech and language are intact. Pt is oriented to person, place, and time. Okay for Regular and Thin diet with strategies.   SLP Total Evaluation Time   Eval: oral/pharyngeal swallow function, clinical swallow Minutes (37396) 20                      SLP Discharge Planning       SLP  Discharge Recommendation   (Per treatment team)   SLP Rationale for DC Rec No anticipated SLP services at d/c   SLP Brief overview of current status  Recommend diet of Regular and Thin with strategies of upright posture, small bites/sip, and alternate liquids/solids.

## 2023-03-02 NOTE — PROGRESS NOTES
RECEIVING UNIT ED HANDOFF REVIEW    ED Nurse Handoff Report was reviewed by: Abby Dey RN on March 2, 2023 at 1:45 PM

## 2023-03-02 NOTE — UTILIZATION REVIEW
Admission Status; Secondary Review Determination       Under the authority of the Utilization Management Committee, the utilization review process indicated a secondary review on the above patient. The review outcome is based on review of the medical records, discussions with staff, and applying clinical experience noted on the date of the review.     (x) Inpatient Status Appropriate - This patient's medical care is consistent with medical management for inpatient care and reasonable inpatient medical practice.     RATIONALE FOR DETERMINATION      89-year-old male was admitted to the hospital on 3/1/2023 for a suspected stroke and was found to be COVID-19 positive in the ED. He has viral pneumonia secondary to COVID-19 infection.  The patient has ground glass opacities in the right upper lungs. He has left facial droop and slurred speech.  Empiric antibiotic therapy has been initiated, and Remdesivir has been started for COVID-19. The patient is at high risk of thrombotic complications due to COVID-19, and anticoagulation is pending d-dimer. A plan has been made for inpatient admission, neurochecks every 4 hours, an MRI brain with and without contrast, NPO pending swallow evaluation, and physical, occupational, and speech therapy. An echo is also planned.    The expected length of stay at the time of admission was more than 2 nights because of the severity of illness, intensity of service provided, and risk for adverse outcome. Inpatient admission is appropriate.         This document was produced using voice recognition software       The information on this document is developed by the utilization review team in order for the business office to ensure compliance. This only denotes the appropriateness of proper admission status and does not reflect the quality of care rendered.   The definitions of Inpatient Status and Observation Status used in making the determination above are those provided in the CMS  Coverage Manual, Chapter 1 and Chapter 6, section 70.4.   Sincerely,   COLLIN HOLDER MD   System Medical Director   Utilization Management   [facility].

## 2023-03-02 NOTE — PLAN OF CARE
Goal Outcome Evaluation:      Plan of Care Reviewed With: patient    Reason for Admission: Stroke workup. Admitted 3/1 with L weakness and dysarthria. CT/MRI negative for anything acute    Cognitive/Mentation: A/Ox4 but is forgetful and impulsive  Neuros/CMS: Intact ex slight dysarthria (per spouse this is his baseline)  VS: Stable on 2L NC  Tele: NSR  GI: BS active, passing flatus, no BM this shift  : Continent, urinal at the bedside  Pulmonary: LS clear, loose/productive cough  Pain: Denies    Drains/Lines: PIV x2  Skin: Intact  Activity: Assist x1/GB  Diet: Regular/thin    Therapies recs: Pending  Discharge: Pending final workup and therapy recs    Aggression Stoplight Tool: Yellow - impulsive, does not call to get up    End of shift summary: Admitted for stroke workup. Neuros stable and unchanged. Covid +, pt denies having a cough but does have a frequent, loose cough and spits phlegm into a kleenex frequently.

## 2023-03-03 ENCOUNTER — APPOINTMENT (OUTPATIENT)
Dept: CARDIOLOGY | Facility: CLINIC | Age: 88
DRG: 177 | End: 2023-03-03
Attending: HOSPITALIST
Payer: COMMERCIAL

## 2023-03-03 ENCOUNTER — APPOINTMENT (OUTPATIENT)
Dept: CARDIOLOGY | Facility: CLINIC | Age: 88
DRG: 177 | End: 2023-03-03
Attending: PHYSICIAN ASSISTANT
Payer: COMMERCIAL

## 2023-03-03 ENCOUNTER — APPOINTMENT (OUTPATIENT)
Dept: OCCUPATIONAL THERAPY | Facility: CLINIC | Age: 88
DRG: 177 | End: 2023-03-03
Attending: HOSPITALIST
Payer: COMMERCIAL

## 2023-03-03 ENCOUNTER — APPOINTMENT (OUTPATIENT)
Dept: SPEECH THERAPY | Facility: CLINIC | Age: 88
DRG: 177 | End: 2023-03-03
Payer: COMMERCIAL

## 2023-03-03 VITALS
BODY MASS INDEX: 22.25 KG/M2 | TEMPERATURE: 98.2 F | WEIGHT: 146.83 LBS | OXYGEN SATURATION: 93 % | RESPIRATION RATE: 16 BRPM | SYSTOLIC BLOOD PRESSURE: 102 MMHG | HEART RATE: 70 BPM | DIASTOLIC BLOOD PRESSURE: 61 MMHG | HEIGHT: 68 IN

## 2023-03-03 LAB — LVEF ECHO: NORMAL

## 2023-03-03 PROCEDURE — 250N000013 HC RX MED GY IP 250 OP 250 PS 637: Performed by: PHYSICIAN ASSISTANT

## 2023-03-03 PROCEDURE — 255N000002 HC RX 255 OP 636: Performed by: HOSPITALIST

## 2023-03-03 PROCEDURE — 93270 REMOTE 30 DAY ECG REV/REPORT: CPT

## 2023-03-03 PROCEDURE — 97165 OT EVAL LOW COMPLEX 30 MIN: CPT | Mod: GO

## 2023-03-03 PROCEDURE — 92526 ORAL FUNCTION THERAPY: CPT | Mod: GN | Performed by: REHABILITATION PRACTITIONER

## 2023-03-03 PROCEDURE — 999N000208 ECHOCARDIOGRAM COMPLETE

## 2023-03-03 PROCEDURE — 93306 TTE W/DOPPLER COMPLETE: CPT | Mod: 26 | Performed by: INTERNAL MEDICINE

## 2023-03-03 PROCEDURE — 93005 ELECTROCARDIOGRAM TRACING: CPT

## 2023-03-03 PROCEDURE — 97535 SELF CARE MNGMENT TRAINING: CPT | Mod: GO

## 2023-03-03 PROCEDURE — 93010 ELECTROCARDIOGRAM REPORT: CPT | Mod: 76 | Performed by: INTERNAL MEDICINE

## 2023-03-03 PROCEDURE — 250N000011 HC RX IP 250 OP 636: Performed by: INTERNAL MEDICINE

## 2023-03-03 PROCEDURE — 99238 HOSP IP/OBS DSCHRG MGMT 30/<: CPT | Performed by: INTERNAL MEDICINE

## 2023-03-03 PROCEDURE — 97530 THERAPEUTIC ACTIVITIES: CPT | Mod: GO

## 2023-03-03 RX ORDER — CLOPIDOGREL BISULFATE 75 MG/1
75 TABLET ORAL DAILY
Qty: 21 TABLET | Refills: 0 | Status: SHIPPED | OUTPATIENT
Start: 2023-03-04 | End: 2023-03-08 | Stop reason: SINTOL

## 2023-03-03 RX ADMIN — AMPICILLIN SODIUM AND SULBACTAM SODIUM 3 G: 2; 1 INJECTION, POWDER, FOR SOLUTION INTRAMUSCULAR; INTRAVENOUS at 04:22

## 2023-03-03 RX ADMIN — HUMAN ALBUMIN MICROSPHERES AND PERFLUTREN 9 ML: 10; .22 INJECTION, SOLUTION INTRAVENOUS at 11:53

## 2023-03-03 RX ADMIN — CLOPIDOGREL BISULFATE 75 MG: 75 TABLET ORAL at 10:05

## 2023-03-03 RX ADMIN — ASPIRIN 81 MG: 81 TABLET, COATED ORAL at 10:05

## 2023-03-03 RX ADMIN — AMPICILLIN SODIUM AND SULBACTAM SODIUM 3 G: 2; 1 INJECTION, POWDER, FOR SOLUTION INTRAMUSCULAR; INTRAVENOUS at 16:35

## 2023-03-03 RX ADMIN — AMPICILLIN SODIUM AND SULBACTAM SODIUM 3 G: 2; 1 INJECTION, POWDER, FOR SOLUTION INTRAMUSCULAR; INTRAVENOUS at 10:05

## 2023-03-03 ASSESSMENT — ACTIVITIES OF DAILY LIVING (ADL)
ADLS_ACUITY_SCORE: 35
PREVIOUS_RESPONSIBILITIES: MEAL PREP;MEDICATION MANAGEMENT;FINANCES;DRIVING

## 2023-03-03 NOTE — PLAN OF CARE
Reason for Admission: TIA/Covid +    Cognitive/Mentation: A/Ox 4  Neuros/CMS: Intact ex forgetful, generalized weakness  VS: stable. 2L NC at night.   Tele: SR with 1st degree block and PVC's  GI: BS active, passing flatus, no BM this shift. Continent.  : Continent.  Pulmonary: LS diminished.  Pain: denies.   Skin: intact  Activity: Assist x 1 with GB.  Diet: regular with thin liquids. Takes pills whole.     Therapies recs: pending  Discharge: pending    Aggression Stoplight Tool: yellow    End of shift summary: pt impulsive overnight.

## 2023-03-03 NOTE — PROGRESS NOTES
Stroke team following peripherally for TTE. Showed LV norm,Proximal septal thickening, EF  60-65%. No wma, no LV thrombus seen, RV norm, norm b/l atria, no color  Doppler evidence of an atrial shunt, mild mitral annular calcification, trace MR, AV thickened and calcified, no AR, mild valvular aortic stenosis, SR.    No findings to change current management. We will sign off. Please contact us with any concerns.

## 2023-03-03 NOTE — DISCHARGE INSTRUCTIONS
Your risk factors for stroke or TIA (transient ischemic attack):    Your Risk Factors Your Results Normal Ranges   High blood pressure BP Readings from Last 1 Encounters:   03/03/23 102/61    Less than 120/80   Cholesterol              Total Lab Results   Component Value Date    CHOL 185 03/01/2023      Less than 150    Triglycerides   Lab Results   Component Value Date    TRIG 65 03/01/2023    Less than 150   LDL Lab Results   Component Value Date     03/01/2023       Less than 70   HDL Lab Results   Component Value Date    HDL 52 03/01/2023            Greater than 40 (men)  Greater than 50 (women)   Diabetes Recent Labs   Lab 03/02/23  2117   *    Fasting blood glucose    Smoking/tobacco use  Quit smoking and tobacco   Overweight  Lose 1-2 pounds a week   Lack of exercise  30 minutes moderate activity each day   Other risk factors include carotid (neck) artery disease, atrial fibrillation and stress. You may be on new medicine to treat high blood pressure, cholesterol, diabetes or atrial fibrillation.    Understanding Stroke Booklet given to patient. Please refer to booklet for further information.    Stroke warning signs and symptoms - CALL 911 right away for:  - Sudden numbness or weakness in the face, arm or leg (often on one side of the body).  - Sudden confusion or trouble understanding what is going on.  - Sudden blurred or decreased vision in one or both eyes.  - Sudden trouble speaking, loss of balance, dizziness or problems with coordination.  - Sudden, severe headache for no reason.  - Fainting or seizures.  - Symptoms may go away then come back suddenly.

## 2023-03-03 NOTE — PROGRESS NOTES
03/03/23 0815   Appointment Info   Signing Clinician's Name / Credentials (OT) Charles Saldana OTR/L   Living Environment   People in Home spouse   Current Living Arrangements house   Home Accessibility stairs to enter home   Number of Stairs, Main Entrance 2;3   Stair Railings, Main Entrance none   Transportation Anticipated family or friend will provide;car, drives self   Living Environment Comments Pt reports that spouse has been staying w/ their son to help take care of him.   Self-Care   Usual Activity Tolerance good   Current Activity Tolerance moderate   Regular Exercise Yes   Activity/Exercise Type walking   Equipment Currently Used at Home none   Fall history within last six months yes   Number of times patient has fallen within last six months 1   Activity/Exercise/Self-Care Comment Pt reports being IND w/ all ADL's prior to admission. Has a tub shower.   Instrumental Activities of Daily Living (IADL)   Previous Responsibilities meal prep;medication management;finances;driving   IADL Comments Pt reports IND w/ all IADL's prior to admission.   General Information   Onset of Illness/Injury or Date of Surgery 03/01/23   Referring Physician Brit Allison MD   Patient/Family Therapy Goal Statement (OT) Return to home   Additional Occupational Profile Info/Pertinent History of Current Problem Jun Bell is a 89 year old male admitted on 3/1/2023 for suspected stroke. He was found to be covid positive in the ED.   Existing Precautions/Restrictions fall   Cognitive Status Examination   Orientation Status orientation to person, place and time   Cognitive Status Comments Pt [place] throughout session.   Visual Perception   Visual Impairment/Limitations corrective lenses full-time   Pain Assessment   Patient Currently in Pain No   Range of Motion Comprehensive   General Range of Motion no range of motion deficits identified   Strength Comprehensive (MMT)   General Manual Muscle Testing (MMT) Assessment no  strength deficits identified   Bed Mobility   Bed Mobility supine-sit;sit-supine   Comment (Bed Mobility) SBA   Transfers   Transfers sit-stand transfer;toilet transfer   Sit-Stand Transfer   Sit/Stand Transfer Comments SBA-Min A   Toilet Transfer   Toilet Transfer Comments SBA   Activities of Daily Living   BADL Assessment/Intervention lower body dressing;grooming   Lower Body Dressing Assessment/Training   Position (Lower Body Dressing) edge of bed sitting   [place] Level (Lower Body Dressing) supervision   Grooming Assessment/Training   Position (Grooming) sink side   Comment, (Grooming) SBA   Clinical Impression   Criteria for Skilled Therapeutic Interventions Met (OT) Yes, treatment indicated   OT Diagnosis Decreased ADL independence and tolerance   Influenced by the following impairments Decreased activity tolerance, safety w/ I/ADL's   OT Problem List-Impairments impacting ADL activity tolerance impaired;cognition;strength   Assessment of Occupational Performance 1-3 Performance Deficits   Identified Performance Deficits Toileting/toilet transfer, IADL, g/h   Planned Therapy Interventions (OT) ADL retraining;IADL retraining;home program guidelines;progressive activity/exercise;risk factor education   Clinical Decision Making Complexity (OT) low complexity   Risk & Benefits of therapy have been explained evaluation/treatment results reviewed;care plan/treatment goals reviewed;risks/benefits reviewed;current/potential barriers reviewed;patient   OT Total Evaluation Time   OT Eval, Low Complexity Minutes (41733) 15   OT Goals   Therapy Frequency (OT) Daily   OT Predicted Duration/Target Date for Goal Attainment 03/09/23   OT Goals Hygiene/Grooming;Toilet Transfer/Toileting;Cognition   OT: Hygiene/Grooming modified independent   OT: Toilet Transfer/Toileting Modified independent   OT: Cognitive Patient/caregiver will verbalize understanding of cognitive assessment results/recommendations as needed for safe  discharge planning   Interventions   Interventions Quick Adds Self-Care/Home Management;Therapeutic Activity   Self-Care/Home Management   Self-Care/Home Mgmt/ADL, Compensatory, Meal Prep Minutes (53764) 15   Symptoms Noted During/After Treatment (Meal Preparation/Planning Training) fatigue   Treatment Detail/Skilled Intervention Pt supine in bed upon arrival and agreeable for OT evaluation. Pt on 2 L of O2 via NC; stats at 95%. Upon sitting EOB, ROM and MMT assessed while sitting edge of bed. Supervision to doff/don B socks while seated edge of bed using cross leg method. Continuous cues provided throughout session; unable to determine if from [place] or cognition. Supervision to complete toileting ADL while seated on toilet. Following completion, SBA for safety to complete g/h task while standing at sink. VC's from  for sequencing of task and locating trash bin in bathroom. Pt returned supine in bed at end of therapy session. O2 stats at 93% following activity.   Therapeutic Activities   Therapeutic Activity Minutes (40157) 8   Symptoms noted during/after treatment fatigue   Treatment Detail/Skilled Intervention Pt supine in bed upon arrival. SBA sup > sit EOB w/ head of bed elevated. Cues to maintain seated as pt attempted to stand immediately upon sitting edge of bed. SBA sit > stand w/ no AD. Pt ambulated to bathroom and back w/ CGA. Pt noted with mild impulsivity throughout therapy session. SBA stand <> sit toilet w/ use of grab bars on R side. Pt reported having no grab bars in home setup. Trialed sit > stand w/ no use of grab bars, Min A for safety from TH. Pt returned to sitting EOB, SBA sit > supine. Pt remained supine in bed at end of therapy session w/ bed alarm activated and call [facility] within reach.   OT Discharge Planning   OT Plan SLUMS, Med management, sequencing ADL's.   OT Discharge Recommendation (DC Rec) home with assist   OT Rationale for DC Rec Pt appears to functioning near baseline w/ transfers  and mobility. Pt currently completing I/ADL's w/ SBA for safety. Pt requiring cues for sequencing tasks and reminders of plan; unable to assess if due to [place] or cognition. Anticipate pt will be able to return home with assist from family w/ higher level I/ADL's following IP therapy. Will continue to monitor cognition and update d/c rec when appropriate.   OT Brief overview of current status Supervision LB dressing, CGA mobility w/ no AD. SBA-Min A w/ toilet transfer   Total Session Time   Timed Code Treatment Minutes 23   Total Session Time (sum of timed and untimed services) 38

## 2023-03-03 NOTE — PLAN OF CARE
Speech Language Therapy Discharge Summary    Reason for therapy discharge:    All goals and outcomes met, no further needs identified.    Progress towards therapy goal(s). See goals on Care Plan in [facility] health record for goal details.  Goals met    Therapy recommendation(s):    No further therapy is recommended.Recommend continue diet of Regular and Thin with strategies of upright posture, small bites/sip, and alternate liquids/solids. Please re-order SLP if concerns arise.    Goal Outcome Evaluation:

## 2023-03-03 NOTE — DISCHARGE SUMMARY
"   [facility]  Discharge Summary        Jun Bell MRN# [id number]   YOB: 1933 Age: 89 year old     Date of Admission:  3/1/2023  Date of Discharge:  3/3/2023  Admitting Physician:  Brit Allison MD  Discharge Physician: Davi Augustin MD  Discharging Service: Hospitalist     Primary Provider:  Chery Mackenzie  Primary Care Physician Phone Number: [phone]         Discharge Diagnoses/Problem Oriented Hospital Course (Providers):    Jun Bell was admitted on 3/1/2023 by Brit Allison MD and I would refer you to their history and physical.  The following problems were addressed during his hospitalization:       Jun Bell is a 89 year old male admitted on 3/1/2023 for suspected stroke. He was found to be covid positive in the ED.     # Confirmed COVID-19 infection    # Viral Pneumonia secondary to COVID-19 infection     Symptom Onset Date used to determine duration of isolation.  If unsure, enter \"unknown\"   Date of 1st Positive Test 03/01/23   Vaccination Status Fully Vaccinated         - COVID-19 special precautions, continuous pulse-ox  - Oxygen: wean oxygen off  - Labs: Standard COVID admission labs ordered (CBC with diff, CMP, INR, D-dimer, CRP).   - Imaging: CTA head revealed ground glass opacities in the right upper lungs  - Breathing treatments: no inhalers needed; avoid nebulizers in favor of MDIs   - IV fluids: not indicated at this time  - Antibiotics: concern for aspiration pneumonia, will empirically start on ceftriaxone and azithromycin   - COVID-Focused Medications: Remdesivir x 3 days or until hospital discharge, started on 03/01/23  - DVT Prophylaxis:         - At high risk of thrombotic complications due to COVID-19 (DDimer = N/A )         - hold off anticoagulation pending d-dimer.       Suspected TIA  Left facial droop  Slurred speech  *Symptoms now resolved  *Stroke alert in the ED, neurology denied them appropriate for tPA  *CT head, CTA " head and neck were unremarkable  -- MRI brain with and without contrast negative for stroke  --normal swallow eval  --PT/OT/SLP ok for home  --Echo shows normal LVEF  --discharge with DAPT x 21 days and then asa daily indefinately    Hyperlipidemia  --   -- start lipitor 20 mg daily  -- recheck LFT's at PCP office     Diet: regular diet  DVT Prophylaxis: Pneumatic Compression Devices  [place] Catheter: Not present  Lines: None     Cardiac Monitoring: None  Code Status:   full code        Clinically Significant Risk Factors Present on Admission       # Coagulation Defect: INR = 1.19 (Ref range: 0.85 - 1.15) and/or PTT = 30 Seconds (Ref range: 22 - 38 Seconds), will monitor for bleeding               Disposition Plan   -- home              Code Status:      Full Code         Important Results:      See below         Pending Results:        Unresulted Labs Ordered in the Past 30 Days of this Admission     Date and Time Order Name Status Description    3/1/2023  4:23 PM Blood Culture Peripheral Blood Preliminary     3/1/2023  4:23 PM Blood Culture Peripheral Blood Preliminary                Discharge Instructions and Follow-Up:      Follow-up Appointments     Follow-up and recommended labs and tests       Follow up with primary care provider, Chery Mackenzie, within 7 days for   hospital follow- up.  The following labs/tests are recommended: LFT's.    Follow up with stroke neurology in 6-8 weeks                  Discharge Disposition:      Discharged to home         Discharge Medications:        Current Discharge Medication List      START taking these medications    Details   aspirin (ASA) 81 MG EC tablet Take 1 tablet (81 mg) by mouth daily for 30 days  Qty: 30 tablet, Refills: 0    Associated Diagnoses: TIA (transient ischemic attack)      clopidogrel (PLAVIX) 75 MG tablet Take 1 tablet (75 mg) by mouth daily for 21 days  Qty: 21 tablet, Refills: 0    Associated Diagnoses: TIA (transient ischemic attack)                   Allergies:       No Known Allergies         Consultations This Hospital Stay:      Consultation during this admission received from neurology          Discharge Orders       After Care Instructions     Activity      Your activity upon discharge: activity as tolerated         Diet      Follow this diet upon discharge: Orders Placed This Encounter      Regular Diet Adult             Future tests that were ordered for you     Stroke Hospital Follow Up (for neurologist use only)      [facility] will call you to coordinate care as prescribed by your provider. If you don t hear from a representative within 2 business days, please call [phone].                        Discharge Time:      less than 30 minutes.        Image Results From This Hospital Stay (For Non-EPIC Providers):        Results for orders placed or performed during the hospital encounter of 03/01/23   CT Head w/o Contrast    Narrative    CT SCAN OF THE HEAD WITHOUT CONTRAST   3/1/2023 4:04 PM     HISTORY: Code stroke to evaluate for potential thrombolysis and  thrombectomy. Altered mental status.    TECHNIQUE: Axial images of the head and coronal reformations without  IV contrast material. Radiation dose for this scan was reduced using  automated exposure control, adjustment of the mA and/or kV according  to patient size, or iterative reconstruction technique.    COMPARISON: Head CT 11/24/2021.    FINDINGS: There is no evidence of intracranial hemorrhage, mass, acute  infarct or anomaly. The ventricles are normal in size, shape and  configuration. The brain parenchyma and subarachnoid spaces are  normal.     Complete opacification of the right maxillary sinus. The bony  calvarium and bones of the skull base appear intact.       Impression    IMPRESSION: No evidence of acute intracranial hemorrhage, mass, or  herniation.      DON SANCHEZ MD         SYSTEM ID:  [id number]   CTA Head Neck with Contrast    Narrative    CT ANGIOGRAM OF THE HEAD  AND NECK WITH CONTRAST  3/1/2023 4:09 PM     HISTORY: Code stroke to evaluate for potential thrombolysis and  thrombectomy. Altered mental status.    TECHNIQUE: CT angiography with an injection of 75 mL [id number]-[id number] IV  with scans through the head and neck. Images were transferred to a  separate 3-D workstation where multiplanar reformations and 3-D images  were created. Estimates of carotid stenoses are made relative to the  distal internal carotid artery diameters except as noted. Radiation  dose for this scan was reduced using automated exposure control,  adjustment of the mA and/or kV according to patient size, or iterative  reconstruction technique.    COMPARISON: None.     CT HEAD FINDINGS: No contrast enhancing lesions. Cerebral blood flow  is grossly normal.     CT ANGIOGRAM HEAD FINDINGS:  The major intracranial arteries including  the proximal branches of the anterior cerebral, middle cerebral, and  posterior cerebral arteries appear patent without vascular cutoff. No  aneurysm identified. No significant stenosis. Venous circulation is  unremarkable.     CT ANGIOGRAM NECK FINDINGS:   Normal origin of the great vessels from the aortic arch.     Right carotid artery: The right common and internal carotid arteries  are patent. Mild atherosclerotic disease at the carotid bifurcation  and proximal internal carotid artery without significant stenosis by  NASCET criteria.     Left carotid artery: The left common and internal carotid arteries are  patent. Mild atherosclerotic disease at the carotid bifurcation and  proximal internal carotid artery without significant stenosis by  NASCET criteria.     Vertebral arteries: Vertebral arteries are patent without evidence of  dissection. No significant stenosis.     Other findings: Patchy groundglass opacities in the visualized right  upper lung. There are degenerative changes in the spine.    Large pocket of heterogeneous debris projecting inferiorly from  the  hypopharynx, this is favored represent a Zenker's diverticulum.      Impression    IMPRESSION:   1. Patent arteries in the neck without evidence of dissection. Mild  atherosclerotic disease in the carotid arteries bilaterally without  significant stenosis by NASCET criteria.  2. Patent proximal major intracranial arteries without vascular  cutoff. No significant stenosis. No aneurysm identified.   3. Patchy groundglass opacities in the visualized right upper lung.  This could represent pneumonia.  4. 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    Results discussed with Stacia Tang at 4:21 PM on 3/1/2023.     DON SANCHEZ MD         SYSTEM ID:  [id number]   Chest XR,  PA & LAT    Narrative    EXAM: XR CHEST 2 VIEWS  LOCATION: [facility]  DATE/TIME: 3/1/2023 4:41 PM    INDICATION: Confusion.  COMPARISON: None.      Impression    IMPRESSION:     Although the anterior right first rib projects over this region, suspicion for subtle right upper lung opacities; infectious / inflammatory process not excluded. Minimal left base bandlike opacity, probably atelectasis.     No pleural effusion or pneumothorax. Normal heart size. Aortic atherosclerosis.       MR Brain w/o & w Contrast    Narrative    EXAM: MR BRAIN W/O and W CONTRAST  LOCATION: [facility]  DATE/TIME: 3/2/2023 2:17 AM    INDICATION: Neuro deficit. Altered mental status.  COMPARISON: 03/01/2023  CONTRAST: 7mL Gadavist   TECHNIQUE: Routine multiplanar multisequence head MRI without and with intravenous contrast.    FINDINGS:  INTRACRANIAL CONTENTS: No acute or subacute infarct. No mass, acute hemorrhage, or extra-axial fluid collections. Scattered nonspecific T2/FLAIR hyperintensities within the cerebral white matter most consistent with mild chronic microvascular ischemic   change. Mild generalized cerebral atrophy. No hydrocephalus. Normal position of the cerebellar tonsils. No pathologic contrast  enhancement.    SELLA: No abnormality accounting for technique.    OSSEOUS STRUCTURES/SOFT TISSUES: Normal marrow signal. The major intracranial vascular flow voids are maintained.     ORBITS: Prior left cataract surgery. Visualized portions of the orbits are otherwise unremarkable.     SINUSES/MASTOIDS: Complete opacification of the right maxillary sinus. Moderate mucosal thickening of the ethmoid air cells. Trace right mastoid effusion.       Impression    IMPRESSION:  1.  No acute intracranial process.  2.  Generalized brain atrophy and presumed microvascular ischemic changes as detailed above.   Echocardiogram Complete - For age > 60 yrs     Value    LVEF  60-65%    Narrative    [id number]  [id number]  [id number]  [id number]^ANGÉLICA^MICHAEL     [facility]  Echocardiography Laboratory  [address] [place] [address]     Name: LY WOODWARD  MRN: [id number]  : 1933  Study Date: 2023 11:28 AM  Age: 89 yrs  Gender: Male  Patient [facility]  Reason For Study: Cerebrovascular Incident  Ordering Physician: MICHAEL LINDSAY  Referring Physician: Chery Mackenzie  Performed By: Christa Brambila     BSA: 1.8 m2  Height: 67 in  Weight: 146 lb  HR: 57  BP: 132/76 mmHg  ______________________________________________________________________________  Procedure  Complete Portable Echo Adult. Optison (NDC #[id number]) given intravenously.  ______________________________________________________________________________  Interpretation Summary     1. The left ventricle is normal in size. Proximal septal thickening is noted.  Left ventricular systolic function is normal. The visual ejection fraction is  60-65%. Diastolic Doppler findings (E/E' ratio and/or other parameters)  suggest left ventricular filling pressures are normal. No regional wall motion  abnormalities noted. There is no thrombus seen in the left ventricle.  2. The right ventricle is normal size. The right ventricular systolic  function  is normal.  3. Trace mitral and tricuspid regurgitation.  4. Mild aortic stenosis.  5. No pericardial effusion.  6. No previous study for comparison.  ______________________________________________________________________________  Left Ventricle  The left ventricle is normal in size. Proximal septal thickening is noted.  Left ventricular systolic function is normal. The visual ejection fraction is  60-65%. Diastolic Doppler findings (E/E' ratio and/or other parameters)  suggest left ventricular filling pressures are normal. No regional wall motion  abnormalities noted. There is no thrombus seen in the left ventricle.     Right Ventricle  The right ventricle is normal size. The right ventricular systolic function is  normal.     Atria  Normal left atrial size. Right atrial size is normal. There is no color  Doppler evidence of an atrial shunt.     Mitral Valve  There is mild mitral annular calcification. There is trace mitral  regurgitation.     Tricuspid Valve  There is trace tricuspid regurgitation. Right ventricular systolic pressure  could not be approximated due to inadequate tricuspid regurgitation.     Aortic Valve  The aortic valve is thickened and calcified. No aortic regurgitation is  present. Mild valvular aortic stenosis. The mean AoV pressure gradient is 14.4  mmHg. The peak AoV pressure gradient is 26.0 mmHg. The calculated aortic valve  are is 2.1 cm^2.     Pulmonic Valve  There is no pulmonic valvular stenosis.     Vessels  The aortic root is normal size. The ascending aorta is Mildly dilated.     Pericardium  There is no pericardial effusion.     Rhythm  Sinus rhythm was noted.  ______________________________________________________________________________  MMode/2D Measurements & Calculations  IVSd: 1.2 cm     LVIDd: 4.3 cm  LVIDs: 3.2 cm  LVPWd: 0.90 cm  FS: 23.9 %  LV mass(C)d: 148.0 grams  LV mass(C)dI: 83.7 grams/m2  Ao root diam: 3.2 cm  LA dimension: 4.1 cm  asc Aorta Diam: 4.0  cm  LA/Ao: 1.3  LVOT diam: 2.4 cm  LVOT area: 4.7 cm2  LA Volume (BP): 44.8 ml  LA Volume Index (BP): 25.3 ml/m2  RWT: 0.42     Doppler Measurements & Calculations  MV E max carloz: 65.1 cm/sec  MV A max carloz: 99.4 cm/sec  MV E/A: 0.65  MV dec time: 0.34 sec  Ao V2 max: 255.4 cm/sec  Ao max P.0 mmHg  Ao V2 mean: 176.6 cm/sec  Ao mean P.4 mmHg  Ao V2 VTI: 61.0 cm  BENNY(I,D): 2.1 cm2  BENNY(V,D): 2.3 cm2  LV V1 max P.1 mmHg  LV V1 max: 123.0 cm/sec  LV V1 VTI: 26.6 cm  SV(LVOT): 125.2 ml  SI(LVOT): 70.8 ml/m2  PA acc time: 0.12 sec  AV Carloz Ratio (DI): 0.48  BENNY Index (cm2/m2): 1.2  E/E' av.9  Lateral E/e': 7.6  Medial E/e': 10.1     ______________________________________________________________________________  Report approved by: Jim Wilcox 2023 12:34 PM                 Most Recent Lab Results In EPIC (For Non-EPIC Providers):    Most Recent 3 CBC's:  Recent Labs   Lab Test 23  0736 23  1558 21   WBC 5.1 9.4 11.2*   HGB 13.5 14.8 14.8   MCV 93 94 94   * 205 218      Most Recent 3 BMP's:  Recent Labs   Lab Test 23  0736 23  1558 21   NA  --  139 139 141   POTASSIUM  --  3.8 4.0 4.9   CHLORIDE  --  103 100 107   CO2  --  26 29 28   BUN  --  11.4 10.5 15   CR  --  0.99 1.05 1.08   ANIONGAP  --  10 10 6   SHARON  --  8.7* 9.1 9.2   * 106* 157* 142*     Most Recent 3 Troponin's:No lab results found.    Invalid input(s): TROP, TROPONINIES  Most Recent 3 INR's:  Recent Labs   Lab Test 23  1558   INR 1.19*     Most Recent 2 LFT's:  Recent Labs   Lab Test 23  1558 21   AST 17 28   ALT 7* 26   ALKPHOS 107 111   BILITOTAL 0.5 0.4     Most Recent Cholesterol Panel:  Recent Labs   Lab Test 23  155   CHOL 185   *   HDL 52   TRIG 65     Most Recent 6 Bacteria Isolates From Any Culture (See EPIC Reports for Culture Details):No lab results found.  Most Recent TSH, T4 and HgbA1c:  Recent Labs   Lab Test  03/01/23  1558   A1C 6.0*

## 2023-03-03 NOTE — PHARMACY-CONSULT NOTE
Pharmacy Consult to evaluate for medication related stroke core measures    Jun Bell, 89 year old male admitted for slurred speech, L sided weakness --> possible TIA on 3/1/2023.    Thrombolytic was not given because of quickly resolving symptoms.    VTE Prophylaxis SCDs /PCDs were ordered on 3/2/2023, as appropriate prior to end of hospital day 2.    Antithrombotic: aspirin and clopidogrel started on 3/2/2023, as appropriate by end of hospital day 2. Continue antithrombotic therapy on discharge to meet quality measures, unless contraindicated.    Anticoagulation if history of A-fib/flutter: Patient does not have history of A-fib/flutter - anticoagulation not required for medication related stroke core measures.     LDL Cholesterol Calculated   Date Value Ref Range Status   03/01/2023 120 (H) <=100 mg/dL Final       Pt is currently not receiving any statin.    Recommendations: consider starting a statin.    Thank you for the consult.    Crystal Viveros, PharmD 3/2/2023 6:44 PM

## 2023-03-04 PROCEDURE — 93272 ECG/REVIEW INTERPRET ONLY: CPT | Performed by: INTERNAL MEDICINE

## 2023-03-04 NOTE — PROGRESS NOTES
Patient discharged at 6 PM to home . IV was discontinued. Pain at time of discharge was 0/10. Belongings returned to patient. Discharge instructions and medications reviewed with patient. Patient verbalized understanding and all questions were answered. Prescriptions given to patient. At time of discharge, patient condition was stable and left the unit in wheelchair escorted by RN.

## 2023-03-04 NOTE — PLAN OF CARE
Occupational Therapy Discharge Summary    Reason for therapy discharge:    Discharged to home. with assist    Progress towards therapy goal(s). See goals on Care Plan in [facility] electronic health record for goal details.  Goals met    Therapy recommendation(s):    No further therapy is recommended.

## 2023-03-04 NOTE — PLAN OF CARE
Physical Therapy Discharge Summary    Reason for therapy discharge:    Discharged to home.    Progress towards therapy goal(s). See [facility] for goal details.  Goals not met as patient seen for eval only.     Therapy recommendation(s):    No further therapy is recommended.    Goal Outcome Evaluation:

## 2023-03-05 ENCOUNTER — PATIENT OUTREACH (OUTPATIENT)
Dept: CARE COORDINATION | Facility: CLINIC | Age: 88
End: 2023-03-05
Payer: COMMERCIAL

## 2023-03-05 NOTE — PROGRESS NOTES
[facility]    Background: Transitional Care Management program identified per system criteria and reviewed by [facility] team for possible outreach.    Assessment: Upon chart review, CCR Team member will not proceed with patient outreach related to this episode of Transitional Care Management program due to reason below:    Patient declined to answer all  post hospital discharge questions with CCRC team member and disconnected call.    Plan: Transitional Care Management episode addressed appropriately per reason noted above.      Concepcion Graves MA  [facility] [place], [facility]    *Connected Care Resource Team does NOT follow patient ongoing. Referrals are identified based on internal discharge reports and the outreach is to ensure patient has an understanding of their discharge instructions.

## 2023-03-06 LAB
ATRIAL RATE - MUSE: 63 BPM
BACTERIA BLD CULT: NO GROWTH
BACTERIA BLD CULT: NO GROWTH
DIASTOLIC BLOOD PRESSURE - MUSE: NORMAL MMHG
INTERPRETATION ECG - MUSE: NORMAL
P AXIS - MUSE: 78 DEGREES
PR INTERVAL - MUSE: 236 MS
QRS DURATION - MUSE: 162 MS
QT - MUSE: 452 MS
QTC - MUSE: 462 MS
R AXIS - MUSE: 61 DEGREES
SYSTOLIC BLOOD PRESSURE - MUSE: NORMAL MMHG
T AXIS - MUSE: 46 DEGREES
VENTRICULAR RATE- MUSE: 63 BPM

## 2023-03-07 LAB
ATRIAL RATE - MUSE: 86 BPM
DIASTOLIC BLOOD PRESSURE - MUSE: NORMAL MMHG
INTERPRETATION ECG - MUSE: NORMAL
P AXIS - MUSE: 67 DEGREES
PR INTERVAL - MUSE: 220 MS
QRS DURATION - MUSE: 150 MS
QT - MUSE: 416 MS
QTC - MUSE: 497 MS
R AXIS - MUSE: 1 DEGREES
SYSTOLIC BLOOD PRESSURE - MUSE: NORMAL MMHG
T AXIS - MUSE: 40 DEGREES
VENTRICULAR RATE- MUSE: 86 BPM

## 2023-03-08 ENCOUNTER — HOSPITAL ENCOUNTER (EMERGENCY)
Facility: CLINIC | Age: 88
Discharge: HOME | End: 2023-03-08
Attending: EMERGENCY MEDICINE | Admitting: EMERGENCY MEDICINE
Payer: COMMERCIAL

## 2023-03-08 VITALS
TEMPERATURE: 97.9 F | DIASTOLIC BLOOD PRESSURE: 78 MMHG | WEIGHT: 152 LBS | SYSTOLIC BLOOD PRESSURE: 133 MMHG | OXYGEN SATURATION: 96 % | BODY MASS INDEX: 23.04 KG/M2 | HEIGHT: 68 IN | HEART RATE: 76 BPM | RESPIRATION RATE: 20 BRPM

## 2023-03-08 DIAGNOSIS — K92.1 MELENA: ICD-10-CM

## 2023-03-08 DIAGNOSIS — K22.5 ZENKER DIVERTICULUM: ICD-10-CM

## 2023-03-08 DIAGNOSIS — G45.9 TIA (TRANSIENT ISCHEMIC ATTACK): ICD-10-CM

## 2023-03-08 DIAGNOSIS — Z86.73 HISTORY OF TIA (TRANSIENT ISCHEMIC ATTACK) AND STROKE: ICD-10-CM

## 2023-03-08 LAB
ANION GAP SERPL CALCULATED.3IONS-SCNC: 8 MMOL/L (ref 7–15)
ATRIAL RATE - MUSE: 66 BPM
BUN SERPL-MCNC: 10.9 MG/DL (ref 8–23)
CALCIUM SERPL-MCNC: 9 MG/DL (ref 8.8–10.2)
CHLORIDE SERPL-SCNC: 101 MMOL/L (ref 98–107)
CREAT SERPL-MCNC: 0.92 MG/DL (ref 0.67–1.17)
DEPRECATED HCO3 PLAS-SCNC: 29 MMOL/L (ref 22–29)
DIASTOLIC BLOOD PRESSURE - MUSE: NORMAL MMHG
ERYTHROCYTE [DISTWIDTH] IN BLOOD BY AUTOMATED COUNT: 12.9 % (ref 10–15)
GFR SERPL CREATININE-BSD FRML MDRD: 80 ML/MIN/1.73M2
GLUCOSE SERPL-MCNC: 186 MG/DL (ref 70–99)
HCT VFR BLD AUTO: 43.6 % (ref 40–53)
HEMOCCULT STL QL: POSITIVE
HGB BLD-MCNC: 14.2 G/DL (ref 13.3–17.7)
HOLD SPECIMEN: NORMAL
INTERPRETATION ECG - MUSE: NORMAL
MCH RBC QN AUTO: 29.6 PG (ref 26.5–33)
MCHC RBC AUTO-ENTMCNC: 32.6 G/DL (ref 31.5–36.5)
MCV RBC AUTO: 91 FL (ref 78–100)
P AXIS - MUSE: 25 DEGREES
PLATELET # BLD AUTO: 182 10E3/UL (ref 150–450)
POTASSIUM SERPL-SCNC: 3.6 MMOL/L (ref 3.4–5.3)
PR INTERVAL - MUSE: 224 MS
QRS DURATION - MUSE: 162 MS
QT - MUSE: 436 MS
QTC - MUSE: 457 MS
R AXIS - MUSE: 25 DEGREES
RBC # BLD AUTO: 4.8 10E6/UL (ref 4.4–5.9)
SODIUM SERPL-SCNC: 138 MMOL/L (ref 136–145)
SYSTOLIC BLOOD PRESSURE - MUSE: NORMAL MMHG
T AXIS - MUSE: 62 DEGREES
UPPER GI ENDOSCOPY: NORMAL
VENTRICULAR RATE- MUSE: 66 BPM
WBC # BLD AUTO: 6.1 10E3/UL (ref 4–11)

## 2023-03-08 PROCEDURE — 99285 EMERGENCY DEPT VISIT HI MDM: CPT | Mod: 25

## 2023-03-08 PROCEDURE — 36415 COLL VENOUS BLD VENIPUNCTURE: CPT | Performed by: EMERGENCY MEDICINE

## 2023-03-08 PROCEDURE — 250N000009 HC RX 250: Performed by: INTERNAL MEDICINE

## 2023-03-08 PROCEDURE — 93005 ELECTROCARDIOGRAM TRACING: CPT | Mod: XU

## 2023-03-08 PROCEDURE — 82272 OCCULT BLD FECES 1-3 TESTS: CPT | Performed by: EMERGENCY MEDICINE

## 2023-03-08 PROCEDURE — 80048 BASIC METABOLIC PNL TOTAL CA: CPT | Performed by: EMERGENCY MEDICINE

## 2023-03-08 PROCEDURE — 85027 COMPLETE CBC AUTOMATED: CPT | Performed by: EMERGENCY MEDICINE

## 2023-03-08 PROCEDURE — 43235 EGD DIAGNOSTIC BRUSH WASH: CPT | Performed by: INTERNAL MEDICINE

## 2023-03-08 PROCEDURE — 250N000011 HC RX IP 250 OP 636: Performed by: INTERNAL MEDICINE

## 2023-03-08 PROCEDURE — 999N000099 HC STATISTIC MODERATE SEDATION < 10 MIN: Performed by: INTERNAL MEDICINE

## 2023-03-08 RX ORDER — FENTANYL CITRATE 50 UG/ML
INJECTION, SOLUTION INTRAMUSCULAR; INTRAVENOUS PRN
Status: DISCONTINUED | OUTPATIENT
Start: 2023-03-08 | End: 2023-03-08 | Stop reason: HOSPADM

## 2023-03-08 RX ORDER — NALOXONE HYDROCHLORIDE 0.4 MG/ML
0.2 INJECTION, SOLUTION INTRAMUSCULAR; INTRAVENOUS; SUBCUTANEOUS
Status: DISCONTINUED | OUTPATIENT
Start: 2023-03-08 | End: 2023-03-08 | Stop reason: HOSPADM

## 2023-03-08 RX ORDER — LIDOCAINE 40 MG/G
CREAM TOPICAL
Status: CANCELLED | OUTPATIENT
Start: 2023-03-08

## 2023-03-08 RX ORDER — FLUMAZENIL 0.1 MG/ML
0.2 INJECTION, SOLUTION INTRAVENOUS
Status: DISCONTINUED | OUTPATIENT
Start: 2023-03-08 | End: 2023-03-08 | Stop reason: HOSPADM

## 2023-03-08 RX ORDER — NALOXONE HYDROCHLORIDE 0.4 MG/ML
0.4 INJECTION, SOLUTION INTRAMUSCULAR; INTRAVENOUS; SUBCUTANEOUS
Status: DISCONTINUED | OUTPATIENT
Start: 2023-03-08 | End: 2023-03-08 | Stop reason: HOSPADM

## 2023-03-08 RX ORDER — FENTANYL CITRATE 50 UG/ML
25-50 INJECTION, SOLUTION INTRAMUSCULAR; INTRAVENOUS EVERY 5 MIN PRN
Status: DISCONTINUED | OUTPATIENT
Start: 2023-03-08 | End: 2023-03-08 | Stop reason: HOSPADM

## 2023-03-08 ASSESSMENT — ACTIVITIES OF DAILY LIVING (ADL)
ADLS_ACUITY_SCORE: 35

## 2023-03-08 ASSESSMENT — ENCOUNTER SYMPTOMS
SHORTNESS OF BREATH: 0
LIGHT-HEADEDNESS: 0
BLOOD IN STOOL: 1
COUGH: 0
FEVER: 0

## 2023-03-08 NOTE — CONSULTS
"      [facility] GASTROENTEROLOGY CONSULTATION     Jun Bell  [address] [id number]  89 year old male    Admission Date/Time: 3/8/2023  Primary Care Provider: No Ref-Primary, Physician    We were asked to see the patient in consultation by Dr. Rodriguez for evaluation of dark stools.        HPI:  Jun Bell is a 89 year old male who presented to [facility] ER 3/8/23 with several episodes of very dark BMs.  Patient unsure if they were solid or liquid.    Patient was admitted a week ago, 3/1/23 with a left facial droop with resolved as well as COVID which was discovered while he was in the ER last week.  He was treated with 3 days of Remdesivir as well as antibiotics for possible aspiration pneumonia.      At discharge he was started on ASA and Plavix. Does not take any other medications.  Stools started to be dark 3/7/23.  Hgb stable.  BUN normal. Patient denies abdominal pain, dysphagia, heartburn.  No alcohol and no smoking.    ROS: A comprehensive ten point review of systems was negative aside from those in mentioned in the HPI.      MEDICATIONS: No current facility-administered medications on file prior to encounter.  aspirin (ASA) 81 MG EC tablet, Take 1 tablet (81 mg) by mouth daily for 30 days  clopidogrel (PLAVIX) 75 MG tablet, Take 1 tablet (75 mg) by mouth daily for 21 days        ALLERGIES:   Allergies   Allergen Reactions     Iodine Unknown       No past medical history on file.    No past surgical history on file.      SOCIAL HISTORY:   Non smoker, no alcohol.    FAMILY HISTORY:  Non contributory    PHYSICAL EXAM:   BP (!) 140/84   Pulse 82   Temp 97.9  F (36.6  C) (Temporal)   Resp 19   Ht 1.727 m (5' 8\")   Wt 68.9 kg (152 lb)   SpO2 93%   BMI 23.11 kg/m      Constitutional: NAD, comfortable    Respiratory: CTAB  Psychiatric: mentation appears normal and affect normal/bright  Head: Normocephalic. Atraumatic.    Neck: normal size, trachea midline  Eyes:  no " Can we just see how Brandy is as would be best if I see her after she sees the neurologist unless something else going on.   Thanks   Cailin Ponce, FARZANA, APRN CNP     Called patient, she denies new symptoms so we rescheduled for 4/2 after neuro appt.  Suyapa Davila RN    icterus  ENT: hearing adequate with hearing aids  Abdomen:   Auscultation: normal  Appearance: normal  Palpation: non tender  NEURO: grossly negative  SKIN: no suspicious lesions or rashes            ADDITIONAL COMMENTS:   I reviewed the patient's new clinical lab test results.   Recent Labs   Lab Test 03/08/23  0945 03/02/23  0736 03/01/23  1558   WBC 6.1 5.1 9.4   HGB 14.2 13.5 14.8   MCV 91 93 94    142* 205   INR  --   --  1.19*     Recent Labs   Lab Test 03/08/23  0945 03/02/23  2117 03/02/23  0736 03/01/23  1558     --  139 139   POTASSIUM 3.6  --  3.8 4.0   CHLORIDE 101  --  103 100   CO2 29  --  26 29   BUN 10.9  --  11.4 10.5   CR 0.92  --  0.99 1.05   ANIONGAP 8  --  10 10   SHARON 9.0  --  8.7* 9.1   * 111* 106* 157*     Recent Labs   Lab Test 03/02/23  0015 03/01/23  1558 11/24/21 1956   ALBUMIN  --  4.0 3.6   BILITOTAL  --  0.5 0.4   ALT  --  7* 26   AST  --  17 28   ALKPHOS  --  107 111   PROTEIN 20*  --   --              .    CONSULTATION ASSESSMENT AND PLAN:    Active Problems:  Dark stools  Assessment: Patient presented to ER with several dark stools. Labs and vitals stable. Was admitted last week with facial droop, concern for stroke and COVID +.  Was started on ASA and Plavix. Plan was for DAPT for 21 days then ASA indefinitely.   Those are the only medications he has been taking.    With ASA and Plavix there is concern for gastritis, duodenitis, ulcer, etc.  Patient is agreeable to EGD today, very much wants to avoid overnight hospital admission if possible.    Plan:  - EGD today with recommendations to follow      Elida Metzger MD  [facility]  Office:  [phone]    Approximately 40 minutes of total time was spent providing patient care, including patient evaluation, reviewing documentation/test results, and .

## 2023-03-08 NOTE — PROGRESS NOTES
"  [facility]    Stroke Telephone Note    I was called by Skinny Rodriguez on 03/08/23 regarding patient Jun Bell. The patient is a 89 year old male who was diagnosed with TIA and seen by us last week for TIA pathway. He was started on DAPT and has cardiac event monitor ongoing. He now has melena. He is hemodynamically stable.     Imaging Findings   No new    Impression  Melena  Recent TIA    Recommendations   - Ok to stop Plavix now. Discuss with GI and continue aspirin if possible, if not, then just resume as soon as is safe from GI perspective. He should continue one antiplatelet agent lifelong with recent TIA diagnosis  - Will offer telephone follow up with us in stroke clinic next week but per chart review looks like he wants to see [facility] instead        My recommendations are based on the information provided over the phone by Jun Bell's in-person providers. They are not intended to replace the clinical judgment of his in-person providers. I was not requested to personally see or examine the patient at this time.    Deidre Weldon PA-C  Vascular Neurology    To page me or covering stroke neurology team member, click here: AMCOM  Choose \"On Call\" tab at top, then select \"NEUROLOGY/ALL SITES\" from middle drop-down box, press Enter, then look for \"stroke\" or \"telestroke\" for your site.        " Cigarettes

## 2023-03-08 NOTE — PROGRESS NOTES
PRE-PROCEDURE NOTE    CHIEF COMPLAINT / REASON FOR PROCEDURE:  Dark stools    History and Physical Reviewed:  yes    PRE-SEDATION ASSESSMENT:    Lung Exam:  normal  Heart Exam:  normal  Mallampati Airway Classification:  2   Previous reaction to anesthesia/sedation:   no  Sedation plan based on assessment:  IV sed  Comment(s):  Risks explained  ASA Classification:  3    IMPRESSION:  Rule out gi bleeding    PLAN:  egd    Elida Metzger MD, MD

## 2023-03-08 NOTE — ED TRIAGE NOTES
Black tarry stool this morning, been on Plavix for one week ago with TIA      Triage Assessment     Row Name 03/08/23 0941       Triage Assessment (Adult)    Airway WDL WDL       Respiratory WDL    Respiratory WDL WDL       Skin Circulation/Temperature WDL    Skin Circulation/Temperature WDL WDL       Cardiac WDL    Cardiac WDL WDL       Peripheral/Neurovascular WDL    Peripheral Neurovascular WDL WDL       Cognitive/Neuro/Behavioral WDL    Cognitive/Neuro/Behavioral WDL WDL

## 2023-03-08 NOTE — DISCHARGE INSTRUCTIONS
Please stop taking your Plavix.  Please take a baby aspirin daily with meals.  Please take your omeprazole twice a day for a month and then daily thereafter.

## 2023-03-08 NOTE — OR NURSING
"Procedures - Endoscopy   Procedure: Upper Endoscopy  Indications: melena  Therapies/Interventions none, food lodged in oral-pharyngeal region and high risk for aspiration   Specimens: Collected no  Sedation: Fentanyl 50 mcg; Midazolam 2 mg  Sedation Time: 6 \"  Airway management: nasal cannula  Response to procedure: tolerated well  SBAR Post procedure to: ED RN     Patient transported via litter to  Emergency room by Radiology  Transport Services after exam.    Zulma Ordonez RN, BSN, CGRN  Endoscopy staff   "

## 2023-03-08 NOTE — ED PROVIDER NOTES
"  History     Chief Complaint:  Melena       The history is provided by the patient.      Jun Bell is a 89 year old male who presents with black stools this morning. He was recently hospitalized for a TIA and placed on Plavix. Denies bright red blood in stool. Denies fever, chills, weakness, dizziness, nausea, vomiting, abdominal pain, chest pain, or shortness of breath. States, \"I feel fine.\" No history of GI bleeds. Notes a transient episode of leg weakness last week lasting a few minutes. No other known medical issues.    Independent Historian:   None - Patient Only    Review of External Notes: Discharge summary reviewed from 3/3/2023 where the patient was admitted for suspected TIA.  His symptoms had resolved at the time of presentation and CT/CTA was thankfully negative for acute findings.  MRI brain furthermore did not show any ischemic territories.  The patient was diagnosed with suspected right upper lobe aspiration pneumonia with concomitant COVID infection.  He was discharged with a plan for dual antiplatelet therapy x21 days and indefinite daily aspirin.  He received antibiotics for aspiration pneumonia in the hospital and was not discharged on any antibiotics.     ROS:  Review of Systems   Constitutional: Negative for fever.   Respiratory: Negative for cough and shortness of breath.    Cardiovascular: Negative for chest pain.   Gastrointestinal: Positive for blood in stool.   Neurological: Negative for syncope and light-headedness.   All other systems reviewed and are negative.    Allergies:  Iodine     Medications:    Aspirin 81 mg   Plavix    Past Medical History:    Brow ptosis  Dermatochalasis of both upper eyelids  Posterior vitreous detachment, bilateral   Glaucoma  Nuclear sclerosis  Cataract  Kidney stone  TIA    Past Surgical History:    Cataract surgery    Tympanostomy tube replacement    Family History:    Mother- cataract, diabetes mellitus, macular degeneration     Social History:  The " "patient presents to the ED with a neighbor via private vehicle.  PCP: Chery Mackenzie     Physical Exam     Patient Vitals for the past 24 hrs:   BP Temp Temp src Pulse Resp SpO2 Height Weight   03/08/23 1646 -- -- -- 55 19 95 % -- --   03/08/23 1631 116/66 -- -- 55 11 96 % -- --   03/08/23 1616 -- -- -- 54 18 94 % -- --   03/08/23 1601 117/61 -- -- 61 12 94 % -- --   03/08/23 1546 103/60 -- -- 64 11 93 % -- --   03/08/23 1531 -- -- -- 68 27 92 % -- --   03/08/23 1516 -- -- -- 52 14 94 % -- --   03/08/23 1440 114/71 -- -- 73 21 92 % -- --   03/08/23 1436 -- -- -- 67 18 96 % -- --   03/08/23 1435 113/67 -- -- 56 19 98 % -- --   03/08/23 1434 -- -- -- 57 17 98 % -- --   03/08/23 1433 -- -- -- 71 18 98 % -- --   03/08/23 1432 -- -- -- 65 15 98 % -- --   03/08/23 1431 -- -- -- 65 17 98 % -- --   03/08/23 1430 117/68 -- -- 70 17 98 % -- --   03/08/23 1429 -- -- -- (!) 49 17 98 % -- --   03/08/23 1428 -- -- -- 55 16 98 % -- --   03/08/23 1427 -- -- -- 60 19 98 % -- --   03/08/23 1426 -- -- -- 55 17 98 % -- --   03/08/23 1425 125/80 -- -- 70 17 99 % -- --   03/08/23 1424 -- -- -- 60 (!) 31 99 % -- --   03/08/23 1423 -- -- -- 74 24 99 % -- --   03/08/23 1422 -- -- -- 73 18 99 % -- --   03/08/23 1421 -- -- -- 78 19 100 % -- --   03/08/23 1420 -- -- -- 59 19 99 % -- --   03/08/23 1419 (!) 146/85 -- -- 54 23 100 % -- --   03/08/23 1418 -- -- -- 84 29 100 % -- --   03/08/23 1417 -- -- -- 75 19 98 % -- --   03/08/23 1416 -- -- -- 72 28 91 % -- --   03/08/23 1415 -- -- -- 77 12 97 % -- --   03/08/23 1414 -- -- -- 79 (!) 33 97 % -- --   03/08/23 1413 -- -- -- 88 (!) 34 92 % -- --   03/08/23 1412 -- -- -- 95 18 98 % -- --   03/08/23 1411 -- -- -- 86 (!) 35 97 % -- --   03/08/23 1410 -- -- -- 93 15 96 % -- --   03/08/23 1409 -- -- -- 81 26 97 % -- --   03/08/23 1408 -- -- -- 90 (!) 31 97 % -- --   03/08/23 1036 (!) 140/84 -- -- 82 19 93 % -- --   03/08/23 0934 127/71 97.9  F (36.6  C) Temporal 72 16 94 % 1.727 m (5' 8\") 68.9 kg (152 lb) "        Physical Exam  General: Laying on the ED bed, no distress  HEENT: Normocephalic, atraumatic  Cardiac: Radial pulses 2+, irregularly irregular  Pulm: Breathing comfortably, no accessory muscle usage, no conversational dyspnea, and lungs clear bilaterally  GI: Abdomen soft, nontender, no rigidity or guarding, rectal exam with few nonbleeding external hemorrhoids and dark brown stool with maroon tinge in the vault, no bright red blood  MSK: No deformities  Skin: Warm and dry  Neuro: Moves all extremities  Psych: Normal mood and affect    Emergency Department Course   ECG:  ECG results from 03/08/23   EKG 12-lead, tracing only     Value    Systolic Blood Pressure     Diastolic Blood Pressure     Ventricular Rate 66    Atrial Rate 66    PA Interval 224    QRS Duration 162        QTc 457    P Axis 25    R AXIS 25    T Axis 62    Interpretation ECG      Sinus rhythm with 1st degree A-V block with Premature atrial complexes  Right bundle branch block  Abnormal ECG  When compared with ECG of 03-MAR-2023 13:49,  No significant change was found  Confirmed by GENERATED REPORT, COMPUTER ([id number]),  Maryann Shepherd ([id number]) on 3/8/2023 11:39:15 AM       Laboratory:  Labs Ordered and Resulted from Time of ED Arrival to Time of ED Departure   BASIC METABOLIC PANEL - Abnormal       Result Value    Sodium 138      Potassium 3.6      Chloride 101      Carbon Dioxide (CO2) 29      Anion Gap 8      Urea Nitrogen 10.9      Creatinine 0.92      Calcium 9.0      Glucose 186 (*)     GFR Estimate 80     OCCULT BLOOD STOOL - Abnormal    Occult Blood Positive (*)    CBC WITH PLATELETS - Normal    WBC Count 6.1      RBC Count 4.80      Hemoglobin 14.2      Hematocrit 43.6      MCV 91      MCH 29.6      MCHC 32.6      RDW 12.9      Platelet Count 182        Emergency Department Course & Assessments:  ED Course as of 03/08/23 1723   Wed Mar 08, 2023   0957 I obtained the history and examined the patient as noted above.    1003 I  performed a chaperoned rectal exam as noted above.   1113 I spoke with neurology regarding the patient's blood thinning medications post- TIA.      1126 I spoke with Dr. Metzger, gastroenterology, regarding the patient.    1132 I rechecked and updated the patient.    1156 I rechecked and updated the patient once again. He wished to go home, but his wife and neighbor had concerns.   1208 I spoke with Dr. Metzger once again. She said she would be able to come in for an esophagogastroduodenoscopy.     1210 I spoke with the patient following my consult with Dr. Metzger.   1449 I spoke with Dr. Metzger following endoscopy. She reports she couldn't see much on the EGD due to food in the digestive tract. She is comfortable with plan for discharge with out-patient follow-up.          Interventions:  Medications   midazolam (VERSED) injection 0.5-2 mg (has no administration in time range)   flumazenil (ROMAZICON) injection 0.2 mg (has no administration in time range)   fentaNYL (PF) (SUBLIMAZE) injection 25-50 mcg (has no administration in time range)   naloxone (NARCAN) injection 0.2 mg (has no administration in time range)     Or   naloxone (NARCAN) injection 0.4 mg (has no administration in time range)     Or   naloxone (NARCAN) injection 0.2 mg (has no administration in time range)     Or   naloxone (NARCAN) injection 0.4 mg (has no administration in time range)        Consultations/Discussion of Management or Tests:  See ED course.    Social Determinants of Health affecting care:  None    Disposition:  The patient was discharged to home.     Impression & Plan    CMS Diagnoses: None  Medical Decision Makin-year-old male presents with concern for melena in the setting of recent TIA which is being treated with 3 weeks of dual antiplatelet therapy.  The patient is hemodynamically stable and well-appearing and aside from melena asymptomatic.  No prior history of GI bleed or ulcers or similar symptoms.  CBC shows no signs  of anemia which is reassuring although it sounds that the first episode of melena was this morning so the patient may not yet be compensated for any ongoing blood loss.  Stool is Hemoccult positive.  I discussed the patient's dual antiplatelet therapy with stroke neurology and they state that he is okay from their standpoint to discontinue the Plavix.  He may also temporarily discontinue the aspirin, although this should remain a long-term medication after the current melena issue is resolved.  The patient's overall presentation is reassuring except for the melena described above.  His age also puts him in a high risk category with regards to his apparent GI bleed.  After discussing multiple options with the patient and also discussing his presentation with our GI colleagues, the plan was for endoscopy later this afternoon and likely discharge home after that if there are no emergent findings on endoscopy.  The patient stated that his goal was to go home after the above.  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  After further discussion with the patient and our GI colleagues, and allowing sedation to wear off, the plan is still for discharge home with omeprazole, discontinuing Plavix, advising GI follow-up as an outpatient and low threshold to return to the ED for further bleeding or other concerns.    Critical Care time:  was 0 minutes for this patient excluding procedures.    Diagnosis:    ICD-10-CM    1. Melena  K92.1 Adult GI  Referral - Consult Only      2. 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 TIA (transient ischemic attack)  G45.9 aspirin (ASA) 81 MG EC tablet         Discharge Medications:  New Prescriptions    OMEPRAZOLE (PRILOSEC) 20 MG DR CAPSULE    Take 2 capsules (40 mg) by mouth 2 times daily for 30  days    OMEPRAZOLE (PRILOSEC) 20 MG DR CAPSULE    Take 2 capsules (40 mg) by mouth daily for 30 days      Scribe Disclosure:  I, Leda Linder, am serving as a scribe at 9:48 AM on 3/8/2023 to document services personally performed by Skinny Rodriguez MD based on my observations and the provider's statements to me.     3/8/2023   Skinny Rodriguez MD King, Colin, MD  03/08/23 1725

## 2023-03-08 NOTE — PROGRESS NOTES
GI Brief Note    EGD was started and scope encountered a very large Zenker's diverticulum impacted with solid food.  I tried to approach from a different angle but could not avoid the Zenkers and had no visualization given the food. Given the risk of aspiration the EGD was aborted.    - I will discuss option of repeating EGD tomorrow with general anesthesia, I do not think patient will be agreeable to that  - with normal hgb, BUN and vitals could conservatively manage with PPI, restart ASA tomorrow with food  - recommend omeprazole 40mg BID for one month then once daily thereafter    ADDENDUM: discussed with patient and his wife. They do not wish to repeat trial of EGD under general anesthesia. Prefer to d/d home off of Plavix and with omeprazole.  Take ASA with food.  Low threshold to return to the ER.        Elida Metzger MD  [facility]  [phone]'

## 2023-03-10 NOTE — CONSULTS
Patient and wife declined need for stroke education class.    Lewis Ellison RN  [facility]  [phone]

## 2023-03-17 DIAGNOSIS — R13.10 DYSPHAGIA: Primary | ICD-10-CM

## 2023-03-23 ENCOUNTER — TELEPHONE (OUTPATIENT)
Dept: GASTROENTEROLOGY | Facility: CLINIC | Age: 88
End: 2023-03-23
Payer: COMMERCIAL

## 2023-03-23 NOTE — TELEPHONE ENCOUNTER
Called Pt to help get him scheduled for a sooner GI appointment. Pt declined and said he will just keep his current appointment scheduled on 6/13/2023 with Dr. Rodriguez Marie. Closing encounter.

## 2023-03-23 NOTE — TELEPHONE ENCOUNTER
Health Call Center    Phone Message    May a detailed message be left on voicemail: yes     Reason for Call: Appointment Intake    Referring Provider Name: Skinny Rodriguez MD  Diagnosis and/or Symptoms: Melena [K92.1] Zenker diverticulum [K22.5]    Per triage notes, patient is to be seen as a GI Urgent, but is currently scheduled on 06/13/2023 with Dr. Rodriguez Marie. Current appointment is outside requested time frame. Please review for further follow up per scheduling guidelines. Thanks!     Action Taken: Message routed to:  [facility] GI    Travel Screening: Not Applicable

## 2023-04-08 ENCOUNTER — TELEPHONE (OUTPATIENT)
Dept: NEUROLOGY | Facility: CLINIC | Age: 88
End: 2023-04-08
Payer: COMMERCIAL

## 2023-04-08 DIAGNOSIS — I49.9 IRREGULAR HEART RHYTHM: Primary | ICD-10-CM

## 2023-04-08 NOTE — CONFIDENTIAL NOTE
"Heart monitor results reviewed and shows \"possible atrial fibrillation\" per cardiology. Will need visit scheduled urgently with PCP (I do not see one listed in chart), cardiology, or neurology team to discuss risks/benefits of anticoagulation. (per chart appears was to be following up with [facility])     Can you please reach out to patient and ensure that a prompt follow-up with one of these teams is scheduled? I will place a referral to f/u with cardiology.  "

## 2023-04-08 NOTE — RESULT ENCOUNTER NOTE
"Heart monitor results reviewed and shows \"possible atrial fibrillation\" per cardiology. Will need visit scheduled urgently with PCP (I do not see one listed in chart), cardiology, or neurology team to discuss risks/benefits of anticoagulation. (per chart appears was to be following up with [facility])     Can you please reach out to patient and ensure that a prompt follow-up with one of these teams is scheduled? I will place a referral to f/u with cardiology."

## 2023-04-10 NOTE — TELEPHONE ENCOUNTER
Spoke to genna, patients daughter,  CTC on file relayed message below. Genna states dad has Seen Neuro at [facility], is currently at PCP visit today, doesn't have cardiologist- they have left a message,  PCP- is    Chery Mackenzie MD      PCP - General, Family Practice     Since 9/19/2017     [phone]     BROWN FERNANDEZ, [facility]

## 2023-04-10 NOTE — TELEPHONE ENCOUNTER
Spoke to ANNMARIE Maya at Dr. Mackenzie's office, pt already had PCP appt this morning and left by now. Zulma is relaying the new finding to Dr. Mackenzie to address. (If afib confirmed, may not be the greatest candidate for anticoagulation due to recent melena.)

## 2023-04-14 ENCOUNTER — OFFICE VISIT (OUTPATIENT)
Dept: CARDIOLOGY | Facility: CLINIC | Age: 88
End: 2023-04-14
Attending: PHYSICIAN ASSISTANT
Payer: COMMERCIAL

## 2023-04-14 VITALS
DIASTOLIC BLOOD PRESSURE: 72 MMHG | HEART RATE: 68 BPM | HEIGHT: 67 IN | SYSTOLIC BLOOD PRESSURE: 112 MMHG | BODY MASS INDEX: 22.26 KG/M2 | WEIGHT: 141.8 LBS

## 2023-04-14 DIAGNOSIS — I49.9 IRREGULAR HEART RHYTHM: ICD-10-CM

## 2023-04-14 PROCEDURE — 99204 OFFICE O/P NEW MOD 45 MIN: CPT | Performed by: INTERNAL MEDICINE

## 2023-04-14 NOTE — LETTER
4/14/2023    Chery Mackenzie MD  [facility] [address]  [facility] [address]    RE: Jun VALARIE Bell       Dear Colleague,     I had the [facility] of [facility] Jun Bell [facility].  CARDIOLOGY CLINIC CONSULTATION    PRIMARY CARE PHYSICIAN:  Chery Mackenzie    Today's clinic visit entailed:   Review of the result(s) of each unique test - echo Labs ECG outside records     The level of medical decision making during this visit was of moderate complexity.    HISTORY OF PRESENT ILLNESS:  This is a very pleasant 89-year-old man who is here with his significant other.  The patient denies any prior major medical problems.  He has been very healthy until 89 years of age.  Recently presented to the emergency department with a TIA.  MRI did not show any acute findings.  He had a outpatient cardiac monitoring done that showed new onset intermittent runs of atrial fibrillation.  Low burden overall.  He was asymptomatic through that.  Echocardiogram showed normal biventricular size and function with mild aortic stenosis.    Today seeing me in cardiology clinic for these above findings.  Denies any cardiovascular symptoms.  No angina syncope presyncope.  He says when he was on dual antiplatelet therapy following his TIA, he had 1 episode of dark stools but other than that he has not had any bleeding.  Currently he takes only baby aspirin    PAST MEDICAL HISTORY:  History reviewed. No pertinent past medical history.  Besides recent TIA    MEDICATIONS:  Current Outpatient Medications   Medication    omeprazole (PRILOSEC) 20 MG DR capsule    rivaroxaban ANTICOAGULANT (XARELTO) 20 MG TABS tablet     No current facility-administered medications for this visit.       ALLERGIES:  Allergies   Allergen Reactions    Iodine Unknown       SOCIAL HISTORY:  I have reviewed this patient's social history and updated it with pertinent information if needed. Jun VALARIE Bell  reports that he has quit smoking. His  smoking use included cigarettes. He has quit using smokeless tobacco. He reports that he does not drink alcohol.    FAMILY HISTORY:  I have reviewed this patient's family history and updated it with pertinent information if needed.   History reviewed. No pertinent family history.    REVIEW OF SYSTEMS:  Skin:        Eyes:  Positive for glasses  ENT:       Respiratory:  Negative    Cardiovascular:  Negative;palpitations;chest pain;dizziness;syncope or near-syncope;lightheadedness;cyanosis;fatigue;edema    Gastroenterology:      Genitourinary:       Musculoskeletal:       Neurologic:       Psychiatric:       Heme/Lymph/Imm:       Endocrine:           PHYSICAL EXAM:      BP: 112/72 Pulse: 68            Vital Signs with Ranges  Pulse:  [68] 68  BP: (112)/(72) 112/72  141 lbs 12.8 oz    Constitutional: alert, no distress  Respiratory: Good bilateral air entry  Cardiovascular: Regular heart rhythm soft systolic murmur no rubs or gallops normal JVP no edema  GI: nondistended  Neuropsychiatric: appropriate affact    ASSESSMENT: Pertinent issues addressed/ reviewed during this cardiology visit  TIA  New onset intermittent paroxysmal atrial fibrillation  Mild asymptomatic aortic stenosis    RECOMMENDATIONS:  The patient had a TIA.  He has new onset atrial fibrillation on his cardiac monitor.  Rates are controlled.  He is asymptomatic.  I recommend he stop his aspirin and start Xarelto 20 mg daily.  Given his 1 episode of dark black stools, I recommend that he get a CBC and an ANGELICA follow-up in 1 month.  If there is a significant drop in hemoglobin, I would have PCP evaluate for GI causes of bleeding and we should evaluate him for watchman implantation.  Establish care with PCP and follow-up as well.    I will see him back in 6 months echocardiogram in the next 1 to 2 years for his aortic stenosis    It was a pleasure seeing this patient in clinic today. Please do not hesitate to contact me with any future questions.     Delfin  TUSHAR Perez, [facility]  Cardiology - [facility]  April 14, 2023    This note was completed in part using dictation via the Dragon voice recognition software. Some word and grammatical errors may occur and must be interpreted in the appropriate clinical context.  If there are any questions pertaining to this issue, please contact me for further clarification.        Thank you for allowing me to participate in the care of your patient.      Sincerely,     Delfin Perez MD     [facility]  cc:   Cristin Newsome PA-C  [address] [address]

## 2023-04-14 NOTE — PROGRESS NOTES
CARDIOLOGY CLINIC CONSULTATION    PRIMARY CARE PHYSICIAN:  Chery Mackenzie    Today's clinic visit entailed:   Review of the result(s) of each unique test - echo Labs ECG outside records     The level of medical decision making during this visit was of moderate complexity.    HISTORY OF PRESENT ILLNESS:  This is a very pleasant 89-year-old man who is here with his significant other.  The patient denies any prior major medical problems.  He has been very healthy until 89 years of age.  Recently presented to the emergency department with a TIA.  MRI did not show any acute findings.  He had a outpatient cardiac monitoring done that showed new onset intermittent runs of atrial fibrillation.  Low burden overall.  He was asymptomatic through that.  Echocardiogram showed normal biventricular size and function with mild aortic stenosis.    Today seeing me in cardiology clinic for these above findings.  Denies any cardiovascular symptoms.  No angina syncope presyncope.  He says when he was on dual antiplatelet therapy following his TIA, he had 1 episode of dark stools but other than that he has not had any bleeding.  Currently he takes only baby aspirin    PAST MEDICAL HISTORY:  History reviewed. No pertinent past medical history.  Besides recent TIA    MEDICATIONS:  Current Outpatient Medications   Medication     omeprazole (PRILOSEC) 20 MG DR capsule     rivaroxaban ANTICOAGULANT (XARELTO) 20 MG TABS tablet     No current facility-administered medications for this visit.       ALLERGIES:  Allergies   Allergen Reactions     Iodine Unknown       SOCIAL HISTORY:  I have reviewed this patient's social history and updated it with pertinent information if needed. Jun Bell  reports that he has quit smoking. His smoking use included cigarettes. He has quit using smokeless tobacco. He reports that he does not drink alcohol.    FAMILY HISTORY:  I have reviewed this patient's family history and updated it with pertinent information if  needed.   History reviewed. No pertinent family history.    REVIEW OF SYSTEMS:  Skin:        Eyes:  Positive for glasses  ENT:       Respiratory:  Negative    Cardiovascular:  Negative;palpitations;chest pain;dizziness;syncope or near-syncope;lightheadedness;cyanosis;fatigue;edema    Gastroenterology:      Genitourinary:       Musculoskeletal:       Neurologic:       Psychiatric:       Heme/Lymph/Imm:       Endocrine:           PHYSICAL EXAM:      BP: 112/72 Pulse: 68            Vital Signs with Ranges  Pulse:  [68] 68  BP: (112)/(72) 112/72  141 lbs 12.8 oz    Constitutional: alert, no distress  Respiratory: Good bilateral air entry  Cardiovascular: Regular heart rhythm soft systolic murmur no rubs or gallops normal JVP no edema  GI: nondistended  Neuropsychiatric: appropriate affact    ASSESSMENT: Pertinent issues addressed/ reviewed during this cardiology visit  TIA  New onset intermittent paroxysmal atrial fibrillation  Mild asymptomatic aortic stenosis    RECOMMENDATIONS:  1. The patient had a TIA.  He has new onset atrial fibrillation on his cardiac monitor.  Rates are controlled.  He is asymptomatic.  2. I recommend he stop his aspirin and start Xarelto 20 mg daily.  3. Given his 1 episode of dark black stools, I recommend that he get a CBC and an ANGELICA follow-up in 1 month.  If there is a significant drop in hemoglobin, I would have PCP evaluate for GI causes of bleeding and we should evaluate him for watchman implantation.  4. Establish care with PCP and follow-up as well.    I will see him back in 6 months echocardiogram in the next 1 to 2 years for his aortic stenosis    It was a pleasure seeing this patient in clinic today. Please do not hesitate to contact me with any future questions.     TUSHAR Veliz, [facility]  Cardiology - [facility]  April 14, 2023    This note was completed in part using dictation via the Dragon voice recognition software. Some word and grammatical errors may occur and must be  interpreted in the appropriate clinical context.  If there are any questions pertaining to this issue, please contact me for further clarification.

## 2023-04-18 ENCOUNTER — TELEPHONE (OUTPATIENT)
Dept: CARDIOLOGY | Facility: CLINIC | Age: 88
End: 2023-04-18
Payer: COMMERCIAL

## 2023-04-18 NOTE — TELEPHONE ENCOUNTER
Patient has Medicare Advantage through [employer].    Xarelto/Eliquis (identical pricing)  --Patient will pay 100% of the first $300 in drugs costs (first month will be as much as $347=$300 + $47), first 3 months as much as $441=$300 + $47 + $47 + $47)  --Subsequent fills will be $47/mo.  --lf/when total drug costs exceed $4,660, price will increase to a 25% coinsurance, equivalent to $131/mo.    Jantoven: $0/mo.    Let me know if patient needs income-based resources for free/discounted drug.    Milena Salguero  Pharmacy Technician/Liaison, Discharge Pharmacy   [phone] (voice or text)  [email or web address]

## 2023-04-18 NOTE — TELEPHONE ENCOUNTER
[facility] Call Center    Phone Message    May a detailed message be left on voicemail: yes     Reason for Call: Other: Spouse states that Jun was placed on a new blood thinner. Patient went to the pharmacy to  and the rx was $500. He did not pick this up due to cost. Is there something different that can be prescribed. Please call the spouse to discuss.      Action Taken: Other: cardiology    Travel Screening: Not Applicable   Thank you!  [facility]

## 2023-04-18 NOTE — TELEPHONE ENCOUNTER
Message sent to our Rx coverage team to get a cost breakdown of eliquis vs xarelto for pt. Pt called to report that xarelto is too expensive. Not sure if he has a deductible he needs to meet before cost will decrease. Jennifer ANGUIANO April 18, 2023, 9:38 AM

## 2023-04-18 NOTE — TELEPHONE ENCOUNTER
Pt's wife called back with update that pt is ok with starting warfarin or jantoven instead of xarelto d/t the cost. He wants his PCP to manage as it's easier for him to go for INR checks there. I will update  to make sure he is ok with this plan and then call PCP office with an update. Jennifer ANGUIANO April 18, 2023, 2:15 PM

## 2023-04-18 NOTE — TELEPHONE ENCOUNTER
I called pt's wife, Cassandra, and discussed the cost breakdown of eliquis vs xarelto. She said pt is being stubborn and says he wants to talk with PCP, , to see if he even needs to go on a blood thinner. She said pt seems restistant to starting xarelto. I let her know that jantoven is the most affordable blood thinner, but requires routine monitoring with INR levels at any [facility] lab. She said she has taken warfarin before so she thinks pt would be ok with starting that. She will discuss with him and call back. She thinks that pt would want to go through PCP clinic for his INR monitoring as it is easier for him to get to the [facility] clinic. She will call me back later today and I will send an update to  at that time. Jennifer ANGUIANO April 18, 2023, 1:50 PM

## 2023-04-19 NOTE — TELEPHONE ENCOUNTER
I called PCP clinic ([facility], located within [facility], ) and spoke to a person from their call center with update that  would like to start pt on a blood thinner d/t his new afib diagnosis and stop aspirin. Pt would like to start warfarin d/t the NOACs being too expensive. Pt wants PCP to manage his INR.  ok with pt starting warfarin. I requested that 's nurse team call back to discuss the process for pt starting with their anticoagulation clinic. Pt has 5/22 CBC lab and ANGELICA visit. I was told someone from 's team will call back.    I called pt's wife, Cassandra, with an update. She wanted to know if she should cancel the ANGELICA appt in May. I told her that pt needs to keep all of his appts as scheduled.  wants to keep a close eye on pt's CBC as pt had a recent episode of possible melena while he was on plavix and aspirin. Pt to call if he has any signs of GI bleeding such as dark and tarry stool or blood in his stool. I will call pt's wife back once I have heard back from PCP clinic. Jennifer ANGUIANO April 19, 2023, 2:58 PM

## 2023-04-21 RX ORDER — WARFARIN SODIUM 2.5 MG/1
TABLET ORAL DAILY
COMMUNITY
Start: 2023-04-21

## 2023-04-21 NOTE — TELEPHONE ENCOUNTER
I reviewed PCP and anticoagulation note in [facility]. Pt was started on warfarin 2.5 mg daily. I called pt's wife and reminded her that pt should stop his aspirin. She said he will start the warfarin tonight. I will update his medication list. She had no other questions at this time. Jennifer ANGUIANO April 21, 2023, 2:29 PM

## 2023-05-22 ENCOUNTER — OFFICE VISIT (OUTPATIENT)
Dept: CARDIOLOGY | Facility: CLINIC | Age: 88
End: 2023-05-22
Attending: INTERNAL MEDICINE
Payer: COMMERCIAL

## 2023-05-22 ENCOUNTER — TELEPHONE (OUTPATIENT)
Dept: CARDIOLOGY | Facility: CLINIC | Age: 88
End: 2023-05-22

## 2023-05-22 ENCOUNTER — LAB (OUTPATIENT)
Dept: LAB | Facility: CLINIC | Age: 88
End: 2023-05-22
Payer: COMMERCIAL

## 2023-05-22 VITALS
SYSTOLIC BLOOD PRESSURE: 130 MMHG | DIASTOLIC BLOOD PRESSURE: 70 MMHG | WEIGHT: 143.3 LBS | HEART RATE: 61 BPM | HEIGHT: 67 IN | OXYGEN SATURATION: 94 % | BODY MASS INDEX: 22.49 KG/M2

## 2023-05-22 DIAGNOSIS — I48.0 PAROXYSMAL ATRIAL FIBRILLATION (H): Primary | ICD-10-CM

## 2023-05-22 DIAGNOSIS — I48.0 PAROXYSMAL ATRIAL FIBRILLATION (H): ICD-10-CM

## 2023-05-22 DIAGNOSIS — D64.9 ANEMIA: Primary | ICD-10-CM

## 2023-05-22 DIAGNOSIS — I49.9 IRREGULAR HEART RHYTHM: ICD-10-CM

## 2023-05-22 LAB
ERYTHROCYTE [DISTWIDTH] IN BLOOD BY AUTOMATED COUNT: 13.5 % (ref 10–15)
HCT VFR BLD AUTO: 40.3 % (ref 40–53)
HGB BLD-MCNC: 12.6 G/DL (ref 13.3–17.7)
MCH RBC QN AUTO: 29.8 PG (ref 26.5–33)
MCHC RBC AUTO-ENTMCNC: 31.3 G/DL (ref 31.5–36.5)
MCV RBC AUTO: 95 FL (ref 78–100)
PLATELET # BLD AUTO: 216 10E3/UL (ref 150–450)
RBC # BLD AUTO: 4.23 10E6/UL (ref 4.4–5.9)
WBC # BLD AUTO: 6.2 10E3/UL (ref 4–11)

## 2023-05-22 PROCEDURE — 36415 COLL VENOUS BLD VENIPUNCTURE: CPT | Performed by: INTERNAL MEDICINE

## 2023-05-22 PROCEDURE — 99214 OFFICE O/P EST MOD 30 MIN: CPT | Performed by: NURSE PRACTITIONER

## 2023-05-22 PROCEDURE — 93000 ELECTROCARDIOGRAM COMPLETE: CPT | Performed by: NURSE PRACTITIONER

## 2023-05-22 PROCEDURE — 85027 COMPLETE CBC AUTOMATED: CPT | Performed by: INTERNAL MEDICINE

## 2023-05-22 NOTE — PROGRESS NOTES
Cardiology Clinic Progress Note  Jun Bell MRN# [id number]   YOB: 1933 Age: 89 year old     Primary cardiologist: Dr. Perez     Reason for visit: atrial fibrillation     History of presenting illness:    Jun Bell is a pleasant 89 year old patient with no significant past medical history who recently presented to the emergency department with a TIA.  MRI did not show any acute findings.  He had an outpatient cardiac monitor that showed new onset of paroxysmal atrial fibrillation.  Overall, his burden was low.  He was asymptomatic.  Echocardiogram showed normal biventricular size and function with mild aortic stenosis.    He was seen by Dr. Perez on 4/14/2023 due to new findings of atrial fibrillation.  He was otherwise doing well from a cardiovascular standpoint.  He was started on Xarelto for stroke prophylaxis.  His aspirin was discontinued.  Patient reported 1 episode of dark black stools while previously on dual antiplatelet therapy for his TIA, therefore plan was to closely monitor with repeat CBC and follow-up.    He subsequently switched to warfarin, as DOACs were cost prohibitive.    Today the patient reports doing well.  He has no cardiopulmonary complaints.  He specifically denies any chest discomfort, shortness of breath, syncope or near syncope, or palpitations.  He walks his dog 15 blocks on a daily basis without any issues.  He denies any recurrent dark tarry stools.  He seems to be tolerating warfarin without any significant bleeding.    His CBC drawn prior to this visit shows hemoglobin 12.6, down from 14.2 prior to the initiation of his anticoagulation.         Assessment and Plan:     ASSESSMENT:    1. Paroxysmal atrial fibrillation.  EKG today shows NSR. On warfarin for anticoagulation.  2. Mild aortic stenosis noted on TTE. With MG of 14 mmHg, asymptomatic.  3. Anemia. Hx of positive occult blood stool in 3/2023 while on DAPT. His hemoglobin has trended down since the  "initiation of anticoagulation.  He denies any recurrent black tarry stool or any other major signs of bleeding.  4. TIA.  No recurrent episodes.    PLAN:     1. Patient appears to be tolerating anticoagulation, therefore recommend continuing warfarin for stroke prophylaxis.  2. Repeat CBC in 1 week to trend hgb, if continuing to trend down, would consider LAAC with watchman device.   3. Repeat echocardiogram in approximately 1 year, sooner if needed, for surveillance of his aortic stenosis.  4. Follow-up with Dr. Perez in October, as previously arranged.          Makeda Estrada, DNP, APRN, CNP  Page: [phone] (8a-5p M-F)    Orders this Visit:  Orders Placed This Encounter   Procedures     EKG 12-lead complete w/read - Clinics (performed today)     Orders Placed This Encounter   Medications     omeprazole (PRILOSEC) 20 MG DR capsule     Sig: Take 20 mg by mouth     There are no discontinued medications.    Today's clinic visit entailed:  Review of the result(s) of each unique test - CBC, echo, ziopatch  Ordering of each unique test  Prescription drug management  30 minutes spent by me on the date of the encounter doing chart review, review of test results, interpretation of tests, patient visit and documentation   Provider  Link to [facility] Help Grid     The level of medical decision making during this visit was of moderate complexity.           Review of Systems:     Review of Systems:  Skin:  not assessed     Eyes:  not assessed    ENT:  not assessed    Respiratory:  Negative    Cardiovascular:  Negative    Gastroenterology: not assessed    Genitourinary:  not assessed    Musculoskeletal:  not assessed    Neurologic:  not assessed    Psychiatric:  not assessed    Heme/Lymph/Imm:  not assessed    Endocrine:  not assessed              Physical Exam:   Vitals: /70   Pulse 61   Ht 1.702 m (5' 7\")   Wt 65 kg (143 lb 4.8 oz)   SpO2 94%   BMI 22.44 kg/m    Constitutional:  cooperative        Skin:  warm and dry " to the touch        Head:  normocephalic        Eyes:  pupils equal and round        ENT:  no pallor or cyanosis        Neck:  JVP normal        Chest:  normal breath sounds, clear to auscultation, normal A-P diameter, normal symmetry, normal respiratory excursion, no use of accessory muscles        Cardiac: regular rhythm;normal S1 and S2;no murmurs, gallops or rubs detected                  Abdomen:  abdomen soft        Vascular: pulses full and equal                                      Extremities and Back:  no edema        Neurological:  no gross motor deficits;affect appropriate             Medications:     Current Outpatient Medications   Medication Sig Dispense Refill     omeprazole (PRILOSEC) 20 MG DR capsule Take 20 mg by mouth       warfarin ANTICOAGULANT (COUMADIN) 2.5 MG tablet Take by mouth daily         History reviewed. No pertinent family history.    Social History     Socioeconomic History     Marital status: Single     Spouse name: Not on file     Number of children: Not on file     Years of education: Not on file     Highest education level: Not on file   Occupational History     Not on file   Tobacco Use     Smoking status: Former     Types: Cigarettes     Quit date:      Years since quittin.4     Smokeless tobacco: Former     Tobacco comments:     Smoked age 15 only about 1/2 pack in whole life   Vaping Use     Vaping status: Not on file   Substance and Sexual Activity     Alcohol use: Never     Drug use: Not on file     Sexual activity: Never   Other Topics Concern     Not on file   Social History Narrative     Not on file     Social Determinants of Health     Financial Resource Strain: Not on file   Food Insecurity: Not on file   Transportation Needs: Not on file   Physical Activity: Not on file   Stress: Not on file   Social Connections: Not on file   Intimate Partner Violence: Not on file   Housing Stability: Not on file            Past Medical History:   History reviewed. No  pertinent past medical history.           Past Surgical History:     Past Surgical History:   Procedure Laterality Date     ESOPHAGOSCOPY, GASTROSCOPY, DUODENOSCOPY (EGD), COMBINED N/A 3/8/2023    Procedure: Esophagoscopy, gastroscopy, duodenoscopy (EGD), combined;  Surgeon: Elida Metzger MD;  Location:  GI              Allergies:   Iodine       Data:   All laboratory data reviewed:    Recent Labs   Lab Test 03/01/23  1558   *   HDL 52   NHDL 133*   CHOL 185   TRIG 65       Lab Results   Component Value Date    WBC 6.1 03/08/2023    WBC 7.1 04/28/2010    RBC 4.80 03/08/2023    RBC 4.47 04/28/2010    HGB 14.2 03/08/2023    HGB 14.0 04/28/2010    HCT 43.6 03/08/2023    HCT 42.2 04/28/2010    MCV 91 03/08/2023    MCV 95 04/28/2010    MCH 29.6 03/08/2023    MCH 31.3 04/28/2010    MCHC 32.6 03/08/2023    MCHC 33.1 04/28/2010    RDW 12.9 03/08/2023    RDW 13.8 04/28/2010     03/08/2023     04/28/2010       Lab Results   Component Value Date     03/08/2023     04/28/2010    POTASSIUM 3.6 03/08/2023    POTASSIUM 4.9 11/24/2021    POTASSIUM 4.0 04/28/2010    CHLORIDE 101 03/08/2023    CHLORIDE 107 11/24/2021    CHLORIDE 98 04/28/2010    CO2 29 03/08/2023    CO2 28 11/24/2021    CO2 34 (H) 04/28/2010    ANIONGAP 8 03/08/2023    ANIONGAP 6 11/24/2021    ANIONGAP 4 (L) 04/28/2010     (H) 03/08/2023     (H) 03/02/2023     (H) 11/24/2021    GLC 94 04/28/2010    BUN 10.9 03/08/2023    BUN 15 11/24/2021    BUN 10 04/28/2010    CR 0.92 03/08/2023    CR 1.44 (H) 04/28/2010    GFRESTIMATED 80 03/08/2023    GFRESTIMATED 48 (L) 04/28/2010    GFRESTBLACK 58 (L) 04/28/2010    SHARON 9.0 03/08/2023    SHARON 8.6 04/28/2010      Lab Results   Component Value Date    AST 17 03/01/2023    ALT 7 (L) 03/01/2023       Lab Results   Component Value Date    A1C 6.0 (H) 03/01/2023       Lab Results   Component Value Date    INR 1.19 (H) 03/01/2023

## 2023-05-22 NOTE — TELEPHONE ENCOUNTER
----- Message from Cayla Barrera RN sent at 5/22/2023  3:11 PM CDT -----  Regarding: FW: Repeat CBC    ----- Message -----  From: Makeda Estrada CNP  Sent: 5/22/2023   3:06 PM CDT  To: Cayla Barrera RN  Subject: Repeat CBC                                       Patient's hgb is trending down since initiation of AC, hx of positive occult blood. His CBC was not finalized prior to his visit today, so would you mind calling patient to let him know we need to repeat his CBC in 1 week?     Thanks!   Makeda

## 2023-05-22 NOTE — LETTER
5/22/2023    Chery Mackenzie MD  [address]  [facility] [address]    RE: Jun Bell       Dear Colleague,     I had the pleasure of seeing Jun Bell in the [facility].  Cardiology Clinic Progress Note  Jun Bell MRN# [id number]   YOB: 1933 Age: 89 year old     Primary cardiologist: Dr. Perez     Reason for visit: atrial fibrillation     History of presenting illness:    Jun Bell is a pleasant 89 year old patient with no significant past medical history who recently presented to the emergency department with a TIA.  MRI did not show any acute findings.  He had an outpatient cardiac monitor that showed new onset of paroxysmal atrial fibrillation.  Overall, his burden was low.  He was asymptomatic.  Echocardiogram showed normal biventricular size and function with mild aortic stenosis.    He was seen by Dr. Perez on 4/14/2023 due to new findings of atrial fibrillation.  He was otherwise doing well from a cardiovascular standpoint.  He was started on Xarelto for stroke prophylaxis.  His aspirin was discontinued.  Patient reported 1 episode of dark black stools while previously on dual antiplatelet therapy for his TIA, therefore plan was to closely monitor with repeat CBC and follow-up.    He subsequently switched to warfarin, as DOACs were cost prohibitive.    Today the patient reports doing well.  He has no cardiopulmonary complaints.  He specifically denies any chest discomfort, shortness of breath, syncope or near syncope, or palpitations.  He walks his dog 15 blocks on a daily basis without any issues.  He denies any recurrent dark tarry stools.  He seems to be tolerating warfarin without any significant bleeding.    His CBC drawn prior to this visit shows hemoglobin 12.6, down from 14.2 prior to the initiation of his anticoagulation.         Assessment and Plan:     ASSESSMENT:    Paroxysmal atrial fibrillation.  EKG today shows NSR. On warfarin for  anticoagulation.  Mild aortic stenosis noted on TTE. With MG of 14 mmHg, asymptomatic.  Anemia. Hx of positive occult blood stool in 3/2023 while on DAPT. His hemoglobin has trended down since the initiation of anticoagulation.  He denies any recurrent black tarry stool or any other major signs of bleeding.  TIA.  No recurrent episodes.    PLAN:     Patient appears to be tolerating anticoagulation, therefore recommend continuing warfarin for stroke prophylaxis.  Repeat CBC in 1 week to trend hgb, if continuing to trend down, would consider LAAC with watchman device.   Repeat echocardiogram in approximately 1 year, sooner if needed, for surveillance of his aortic stenosis.  Follow-up with Dr. Perez in October, as previously arranged.          Makeda Estrada, DNP, APRN, CNP  Page: [phone] (8a-5p M-F)    Orders this Visit:  Orders Placed This Encounter   Procedures    EKG 12-lead complete w/read - Clinics (performed today)     Orders Placed This Encounter   Medications    omeprazole (PRILOSEC) 20 MG DR capsule     Sig: Take 20 mg by mouth     There are no discontinued medications.    Today's clinic visit entailed:  Review of the result(s) of each unique test - CBC, echo, ziopatch  Ordering of each unique test  Prescription drug management  30 minutes spent by me on the date of the encounter doing chart review, review of test results, interpretation of tests, patient visit and documentation   Provider  Link to [facility] Help Grid     The level of medical decision making during this visit was of moderate complexity.           Review of Systems:     Review of Systems:  Skin:  not assessed     Eyes:  not assessed    ENT:  not assessed    Respiratory:  Negative    Cardiovascular:  Negative    Gastroenterology: not assessed    Genitourinary:  not assessed    Musculoskeletal:  not assessed    Neurologic:  not assessed    Psychiatric:  not assessed    Heme/Lymph/Imm:  not assessed    Endocrine:  not assessed               "Physical Exam:   Vitals: /70   Pulse 61   Ht 1.702 m (5' 7\")   Wt 65 kg (143 lb 4.8 oz)   SpO2 94%   BMI 22.44 kg/m    Constitutional:  cooperative        Skin:  warm and dry to the touch        Head:  normocephalic        Eyes:  pupils equal and round        ENT:  no pallor or cyanosis        Neck:  JVP normal        Chest:  normal breath sounds, clear to auscultation, normal A-P diameter, normal symmetry, normal respiratory excursion, no use of accessory muscles        Cardiac: regular rhythm;normal S1 and S2;no murmurs, gallops or rubs detected                  Abdomen:  abdomen soft        Vascular: pulses full and equal                                      Extremities and Back:  no edema        Neurological:  no gross motor deficits;affect appropriate             Medications:     Current Outpatient Medications   Medication Sig Dispense Refill    omeprazole (PRILOSEC) 20 MG DR capsule Take 20 mg by mouth      warfarin ANTICOAGULANT (COUMADIN) 2.5 MG tablet Take by mouth daily         History reviewed. No pertinent family history.    Social History     Socioeconomic History    Marital status: Single     Spouse name: Not on file    Number of children: Not on file    Years of education: Not on file    Highest education level: Not on file   Occupational History    Not on file   Tobacco Use    Smoking status: Former     Types: Cigarettes     Quit date:      Years since quittin.4    Smokeless tobacco: Former    Tobacco comments:     Smoked age 15 only about 1/2 pack in whole life   Vaping Use    Vaping status: Not on file   Substance and Sexual Activity    Alcohol use: Never    Drug use: Not on file    Sexual activity: Never   Other Topics Concern    Not on file   Social History Narrative    Not on file     Social Determinants of Health     Financial Resource Strain: Not on file   Food Insecurity: Not on file   Transportation Needs: Not on file   Physical Activity: Not on file   Stress: Not on file "   Social Connections: Not on file   Intimate Partner Violence: Not on file   Housing Stability: Not on file            Past Medical History:   History reviewed. No pertinent past medical history.           Past Surgical History:     Past Surgical History:   Procedure Laterality Date    ESOPHAGOSCOPY, GASTROSCOPY, DUODENOSCOPY (EGD), COMBINED N/A 3/8/2023    Procedure: Esophagoscopy, gastroscopy, duodenoscopy (EGD), combined;  Surgeon: Elida Metzger MD;  Location:  GI              Allergies:   Iodine       Data:   All laboratory data reviewed:    Recent Labs   Lab Test 03/01/23  1558   *   HDL 52   NHDL 133*   CHOL 185   TRIG 65       Lab Results   Component Value Date    WBC 6.1 03/08/2023    WBC 7.1 04/28/2010    RBC 4.80 03/08/2023    RBC 4.47 04/28/2010    HGB 14.2 03/08/2023    HGB 14.0 04/28/2010    HCT 43.6 03/08/2023    HCT 42.2 04/28/2010    MCV 91 03/08/2023    MCV 95 04/28/2010    MCH 29.6 03/08/2023    MCH 31.3 04/28/2010    MCHC 32.6 03/08/2023    MCHC 33.1 04/28/2010    RDW 12.9 03/08/2023    RDW 13.8 04/28/2010     03/08/2023     04/28/2010       Lab Results   Component Value Date     03/08/2023     04/28/2010    POTASSIUM 3.6 03/08/2023    POTASSIUM 4.9 11/24/2021    POTASSIUM 4.0 04/28/2010    CHLORIDE 101 03/08/2023    CHLORIDE 107 11/24/2021    CHLORIDE 98 04/28/2010    CO2 29 03/08/2023    CO2 28 11/24/2021    CO2 34 (H) 04/28/2010    ANIONGAP 8 03/08/2023    ANIONGAP 6 11/24/2021    ANIONGAP 4 (L) 04/28/2010     (H) 03/08/2023     (H) 03/02/2023     (H) 11/24/2021    GLC 94 04/28/2010    BUN 10.9 03/08/2023    BUN 15 11/24/2021    BUN 10 04/28/2010    CR 0.92 03/08/2023    CR 1.44 (H) 04/28/2010    GFRESTIMATED 80 03/08/2023    GFRESTIMATED 48 (L) 04/28/2010    GFRESTBLACK 58 (L) 04/28/2010    SHARON 9.0 03/08/2023    SHARON 8.6 04/28/2010      Lab Results   Component Value Date    AST 17 03/01/2023    ALT 7 (L) 03/01/2023       Lab  Results   Component Value Date    A1C 6.0 (H) 03/01/2023       Lab Results   Component Value Date    INR 1.19 (H) 03/01/2023             Thank you for allowing me to participate in the care of your patient.      Sincerely,     Makeda Estrada, [facility]  cc:   Delfin Perez MD  [address]  [place] [id number]

## 2023-05-22 NOTE — TELEPHONE ENCOUNTER
Spoke with pt about CBC results today and informed pt that he needs another lab in 1 week.  Pt scheduled to have CBC on 5/30/23.   Pt gave verbal understanding.

## 2023-05-22 NOTE — PATIENT INSTRUCTIONS
Today's Plan:     1) Follow-up with Dr. Perez in October.     If you have questions or concerns please call my nurse team at [phone].       Scheduling phone number: [phone]    Reminder: Please bring in all current medications, over the counter supplements and vitamin bottles to your next appointment.-    It was a pleasure seeing you today!     Makeda Estrada CNP  5/22/2023    -

## 2023-05-30 ENCOUNTER — TELEPHONE (OUTPATIENT)
Dept: CARDIOLOGY | Facility: CLINIC | Age: 88
End: 2023-05-30

## 2023-05-30 ENCOUNTER — LAB (OUTPATIENT)
Dept: LAB | Facility: CLINIC | Age: 88
End: 2023-05-30
Payer: COMMERCIAL

## 2023-05-30 DIAGNOSIS — D64.9 ANEMIA: ICD-10-CM

## 2023-05-30 DIAGNOSIS — I48.0 PAROXYSMAL ATRIAL FIBRILLATION (H): ICD-10-CM

## 2023-05-30 LAB
ERYTHROCYTE [DISTWIDTH] IN BLOOD BY AUTOMATED COUNT: 13.4 % (ref 10–15)
HCT VFR BLD AUTO: 41.6 % (ref 40–53)
HGB BLD-MCNC: 13 G/DL (ref 13.3–17.7)
MCH RBC QN AUTO: 29.8 PG (ref 26.5–33)
MCHC RBC AUTO-ENTMCNC: 31.3 G/DL (ref 31.5–36.5)
MCV RBC AUTO: 95 FL (ref 78–100)
PLATELET # BLD AUTO: 239 10E3/UL (ref 150–450)
RBC # BLD AUTO: 4.36 10E6/UL (ref 4.4–5.9)
WBC # BLD AUTO: 6.2 10E3/UL (ref 4–11)

## 2023-05-30 PROCEDURE — 36415 COLL VENOUS BLD VENIPUNCTURE: CPT | Performed by: NURSE PRACTITIONER

## 2023-05-30 PROCEDURE — 85027 COMPLETE CBC AUTOMATED: CPT | Performed by: NURSE PRACTITIONER

## 2023-05-30 NOTE — TELEPHONE ENCOUNTER
Called and spoke with pt to communicate that his Hgb is stable and he can continue with his anticoagulation.  Pt on warfarin.  Pt had no questions.        Makeda Estrada, CNP  [facility] Heart Team 1  Hgb stable. Continue anticoagulation. Thanks!

## 2023-06-05 ENCOUNTER — TELEPHONE (OUTPATIENT)
Dept: GASTROENTEROLOGY | Facility: CLINIC | Age: 88
End: 2023-06-05
Payer: COMMERCIAL

## 2023-06-05 NOTE — TELEPHONE ENCOUNTER
Called to remind patient of their upcoming appointment with our GI clinic, on Tuesday 6/13/2023 at 11:00AM with Dr. Rodriguez Marie. This appointment is scheduled as an in-person appt. Please arrive 15 minutes early to check in for your appointment. , if your appointment is virtual (video or telephone) you need to be in [place] for the visit. To reschedule or cancel patient to call [phone].    Alexsandra Quiros MA

## 2023-06-09 NOTE — TELEPHONE ENCOUNTER
REFERRAL INFORMATION:    Referring Provider:  Dr. Skinny Rodriguez MD    Referring Clinic:  [facility] ED    Reason for Visit/Diagnosis: Melena and Zenker diverticulum     FUTURE VISIT INFORMATION:    Appointment Date: 6/13/2023    Appointment Time: 11:20 AM     NOTES STATUS DETAILS   OFFICE NOTE from Referring Provider Internal    OFFICE NOTE from Other Specialist Care Everywhere [facility]:  5/9/23; 4/10/23-   OV with Chery Mackenzie for Zenker's diverticulum, gastrointestinal hemorrhage, etc.    HOSPITAL DISCHARGE SUMMARY/  ED VISITS Internal [facility]:  3/8/23- ED visit with Dr. Skinny Rodriguez MD for melena and zenker diverticulum    OPERATIVE REPORT Care Everywhere [facility]:  2/4/19- LEFT KELMAN PHACOEMULSIFICATION CLEAR CORNEAL INTRAOCULAR LENS IMPLANT      MEDICATION LIST Internal         ENDOSCOPY  Internal [facility]:  3/8/23- Upper GI Endoscopy   COLONOSCOPY N/A    ERCP N/A    EUS N/A    STOOL TESTING N/A    PERTINENT LABS Internal    PATHOLOGY REPORTS (RELATED) N/A    IMAGING (CT, MRI, EGD, MRCP, Small Bowel Follow Through/SBT, MR/CT Enterography) Internal [facility]:  3/1/23- CTA Head/Neck

## 2023-06-13 ENCOUNTER — OFFICE VISIT (OUTPATIENT)
Dept: GASTROENTEROLOGY | Facility: CLINIC | Age: 88
End: 2023-06-13
Payer: COMMERCIAL

## 2023-06-13 ENCOUNTER — PRE VISIT (OUTPATIENT)
Dept: GASTROENTEROLOGY | Facility: CLINIC | Age: 88
End: 2023-06-13

## 2023-06-13 VITALS
HEIGHT: 67 IN | WEIGHT: 140 LBS | DIASTOLIC BLOOD PRESSURE: 83 MMHG | OXYGEN SATURATION: 98 % | HEART RATE: 56 BPM | SYSTOLIC BLOOD PRESSURE: 144 MMHG | BODY MASS INDEX: 21.97 KG/M2

## 2023-06-13 DIAGNOSIS — K27.9 PEPTIC ULCER DISEASE: Primary | ICD-10-CM

## 2023-06-13 PROCEDURE — 99203 OFFICE O/P NEW LOW 30 MIN: CPT | Performed by: INTERNAL MEDICINE

## 2023-06-13 ASSESSMENT — PAIN SCALES - GENERAL: PAINLEVEL: NO PAIN (0)

## 2023-06-13 NOTE — NURSING NOTE
"Chief Complaint   Patient presents with     New Patient       Vitals:    06/13/23 1113   BP: (!) 144/83   Pulse: 56   SpO2: 98%   Weight: 63.5 kg (140 lb)   Height: 1.702 m (5' 7\")       Body mass index is 21.93 kg/m .    [employer]    "

## 2023-06-13 NOTE — LETTER
6/13/2023         RE: Jun Bell  [address] [facility] [address]        Dear Colleague,    Thank you for referring your patient, Jun Bell, to the [facility] [place]. Please see a copy of my visit note below.    [facility]     Date: June 13, 2023    Rodriguez Marie MD  Associate Professor of Medicine  Division of Gastroenterology, Hepatology, and Nutrition  [facility]      Assessment:  Large Zenker's  Apparent GI bleed  Currently asymptomatic     Plan:   Reduce omeprazole to 20 mg po daily- do not go lower and continue that dose indefinitely, life long.   Although the patient has a Zenker's- I don't think the risk benefit is in favor of treating this even endoscopically.    Reason for Consult:   Zenker's   GI bleed.     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  He was placed on PPI Omeprazole 40 mg po BID and has done quite well.  A few months ago the dose was reduced to 20 mg po BID.     He denies weight loss, vomiting, abdominal pain, black or bloody stools.  He denies regurgitation, although at times uses tissues to clear debris from his mouth.   He is not interested in any treatments and I am not really inclined to offer them given his age and frailty.        Most recent CBC:  Recent Labs   Lab Test 05/30/23  1101 05/22/23  1202   WBC 6.2 6.2   HGB 13.0* 12.6*   HCT 41.6 40.3    216     Most recent hepatic panel:  Recent Labs   Lab Test 03/01/23  1558 11/24/21  1956   ALT 7* 26   AST 17 28     Most recent creatinine:  Recent Labs   Lab Test 03/08/23  0945 03/02/23  0736   CR 0.92 0.99         Medications:  Current  "Outpatient Medications   Medication Sig Dispense Refill    omeprazole (PRILOSEC) 20 MG DR capsule Take 1 capsule (20 mg) by mouth daily 90 capsule 3    omeprazole (PRILOSEC) 20 MG DR capsule Take 20 mg by mouth      warfarin ANTICOAGULANT (COUMADIN) 2.5 MG tablet Take by mouth daily          Allergies:      Allergies   Allergen Reactions    Iodine Unknown        Vital Signs:   BP (!) 144/83   Pulse 56   Ht 1.702 m (5' 7\")   Wt 63.5 kg (140 lb)   SpO2 98%   BMI 21.93 kg/m      Physical Exam:  General: No distress, breathing comfortably   Eyes: No icterus or injection  Heart: RRR no murmurs rubs or gallops  Lungs: CTA bilaterally   Abd: soft non tender bs+  - organomegaly   Ext: warm well perfused  Gait:  normal  MS: Alert oriented normal speech.   Psych: Normal affect   Skin: No jaundice or rash        Rodriguez Marie MD    "

## 2024-01-01 ENCOUNTER — APPOINTMENT (OUTPATIENT)
Dept: CT IMAGING | Facility: CLINIC | Age: 89
End: 2024-01-01
Attending: EMERGENCY MEDICINE
Payer: COMMERCIAL

## 2024-01-01 ENCOUNTER — HOSPITAL ENCOUNTER (EMERGENCY)
Facility: CLINIC | Age: 89
Discharge: HOME | End: 2024-04-29
Attending: EMERGENCY MEDICINE | Admitting: EMERGENCY MEDICINE
Payer: COMMERCIAL

## 2024-01-01 ENCOUNTER — CARE COORDINATION (OUTPATIENT)
Dept: CARDIOLOGY | Facility: CLINIC | Age: 89
End: 2024-01-01
Payer: COMMERCIAL

## 2024-01-01 VITALS
DIASTOLIC BLOOD PRESSURE: 56 MMHG | HEART RATE: 58 BPM | BODY MASS INDEX: 21.35 KG/M2 | WEIGHT: 136 LBS | SYSTOLIC BLOOD PRESSURE: 95 MMHG | HEIGHT: 67 IN | OXYGEN SATURATION: 96 % | RESPIRATION RATE: 16 BRPM | TEMPERATURE: 97.3 F
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
## 2024-01-01 DIAGNOSIS — S00.03XA CONTUSION OF SCALP, INITIAL ENCOUNTER: ICD-10-CM

## 2024-01-01 PROCEDURE — 72125 CT NECK SPINE W/O DYE: CPT

## 2024-01-01 PROCEDURE — 99285 EMERGENCY DEPT VISIT HI MDM: CPT | Mod: 25

## 2024-01-01 PROCEDURE — 70450 CT HEAD/BRAIN W/O DYE: CPT

## 2024-01-01 ASSESSMENT — COLUMBIA-SUICIDE SEVERITY RATING SCALE - C-SSRS
6. HAVE YOU EVER DONE ANYTHING, STARTED TO DO ANYTHING, OR PREPARED TO DO ANYTHING TO END YOUR LIFE?: NO
1. IN THE PAST MONTH, HAVE YOU WISHED YOU WERE DEAD OR WISHED YOU COULD GO TO SLEEP AND NOT WAKE UP?: NO
2. HAVE YOU ACTUALLY HAD ANY THOUGHTS OF KILLING YOURSELF IN THE PAST MONTH?: NO

## 2024-01-01 ASSESSMENT — ACTIVITIES OF DAILY LIVING (ADL)
ADLS_ACUITY_SCORE: 38
ADLS_ACUITY_SCORE: 38

## 2024-04-26 NOTE — PROGRESS NOTES
Pt wife called left  stating the pt is due for dental surgery on 4/30/24 and they would like approval to hold warfarin for this procedure. Last OV w/Makeda Estrada N on 5/22/23. She is out today. Dr. Perez saw pt previous to that visit on 4/14/23. Called out to wife, no answer on phone number she left. Left  for her with our return phone number. Tried the home phone listed for the spouse, that is not valid either. Tried the mobile number listed for pt, that is not in valid either. Will wait for spouse to call back.  Migdalia Montero RN on 4/26/2024 at 3:32 PM

## 2024-04-29 NOTE — ED TRIAGE NOTES
Room was dark, patient ran into a piece of furniture hitting the L side of his head.  Small abrasion noted above L ear.   Then fell to the ground but doesn't believe hit his head.  Fell onto carpet.    On warfarin.      Triage Assessment (Adult)       Row Name 04/29/24 8311          Triage Assessment    Airway WDL WDL        Respiratory WDL    Respiratory WDL WDL        Skin Circulation/Temperature WDL    Skin Circulation/Temperature WDL WDL        Cardiac WDL    Cardiac WDL WDL        Peripheral/Neurovascular WDL    Peripheral Neurovascular WDL WDL        Cognitive/Neuro/Behavioral WDL    Cognitive/Neuro/Behavioral WDL WDL

## 2024-04-29 NOTE — ED PROVIDER NOTES
"  History     Chief Complaint:  Fall       HPI   Jun Bell is a 89 year old male on Warfarin for atrial fibrillation with a past medical history of TIA here for evaluation after a fall. Patient's wife states that yesterday he tripped over something and hit his head when the lights were off around 2130 last night. Denies pain after the fall. No loss of consciousness, vomiting or increased confusion after the fall. He went in to the doctor today and his INR was abnormally high, which prompted him to come to the ER. He is a chemotherapy patient, diagnosed with abdominal carcinomatosis and primary malignant neoplasm of sigmoid colon on 8/31/23.     Independent Historian:   Spouse/Partner - They report and corroborate the HPI.    Review of External Notes:   I reviewed an oncology note from 28 April 2024    Medications:    Warfarin 2.5 mg   Prilosec       Past Medical History:    Brow ptosis  Dermatochalasis of both upper eyelids  Posterior vitreous detachment, bilateral   Glaucoma  Nuclear sclerosis  Cataract  Kidney stone  TIA  Abdominal carcinomatosis   Primary malignant neoplasm of sigmoid colon   Atrial fibrillation   Zenker's diverticulum     Past Surgical History:    Cataract surgery    Tympanostomy tube replacement   Tonsillectomy     Physical Exam   Patient Vitals for the past 24 hrs:   BP Temp Temp src Pulse Resp SpO2 Height Weight   04/29/24 1755 -- -- -- -- -- 92 % -- --   04/29/24 1750 96/65 -- -- 58 -- -- -- --   04/29/24 1718 105/58 -- -- -- -- -- -- --   04/29/24 1717 -- 97.3  F (36.3  C) Temporal 58 18 96 % 1.702 m (5' 7\") 61.7 kg (136 lb)        Physical Exam  General: Alert and Interactive.   Head: Abrasion over left lateral scalp.   Mouth/Throat: Oropharynx is clear and moist.   Eyes: Conjunctivae are normal. Pupils are equal, round, and reactive to light.   Neck: Normal range of motion. No nuchal rigidity.  No posterior cervical spine tenderness.  CV: Normal rate and regular rhythm.    Resp: " Effort normal and breath sounds normal. No respiratory distress.   GI: Soft. There is no tenderness or guarding.   MSK: Normal range of motion. no edema.   Neuro: The patient is alert and oriented to person, place, and time.  PERRLA, EOMI, strength in upper/lower extremities normal and symmetrical.   Sensation normal. Speech normal.  GCS eye subscore is 4. GCS verbal subscore is 5. GCS motor subscore is 6.   Skin: Skin is warm and dry. No rash noted.  Psych: normal mood and affect. behavior is normal.     Emergency Department Course       Imaging:  CT Cervical Spine w/o Contrast   Final Result   IMPRESSION:   1.  No fracture or posttraumatic subluxation.         Head CT w/o contrast   Final Result   IMPRESSION:   1.  No CT evidence for acute intracranial process.   2.  Mild chronic microvascular ischemic changes as above.             Emergency Department Course & Assessments:        Independent Interpretation (X-rays, CTs, rhythm strip):  Head CT shows no evidence of intracerebral hemorrhage    Assessments/Consultations/Discussion of Management or Tests:   ED Course as of 04/29/24 1811 Mon Apr 29, 2024 1809 I obtained history and examined the patient as noted above.        Social Determinants of Health affecting care:   Stress/Adjustment Disorders    Disposition:  The patient was discharged.     Impression & Plan    Medical Decision Making:  Jun Bell is a 89 year old male who presents for evaluation of a fall.  This was a mechanical fall, not syncope.  There were no prodromal symptoms so I doubt stroke, cardiac arrhythmia or other serious etiology.  Detailed exam shows a small abrasion of the left scalp.  The patient is on Coumadin and his INR has been fairly consistent.  Head CT shows no evidence intercerebral hemorrhage.  Cervical spine CT shows no evidence for an injury to the cervical spine.  I had the [facility] ambulate the patient and he did well.  Based on this I would send home for outpatient management.        Diagnosis:    ICD-10-CM    1. Contusion of scalp, initial encounter  S00.03XA       2. Fall, initial encounter  W19.XXXA                Scribe Disclosure:  I, Beverley Clarke, am serving as a scribe at 6:19 PM on 4/29/2024 to document services personally performed by Derrick Yanez MD based on my observations and the provider's statements to me.   4/29/2024   Derrick Yanez MD Joing, Todd Roger, MD  04/30/24 1402

## (undated) RX ORDER — FENTANYL CITRATE 50 UG/ML
INJECTION, SOLUTION INTRAMUSCULAR; INTRAVENOUS
Status: DISPENSED
Start: 2023-03-08